# Patient Record
Sex: FEMALE | Race: BLACK OR AFRICAN AMERICAN | NOT HISPANIC OR LATINO | Employment: FULL TIME | URBAN - METROPOLITAN AREA
[De-identification: names, ages, dates, MRNs, and addresses within clinical notes are randomized per-mention and may not be internally consistent; named-entity substitution may affect disease eponyms.]

---

## 2018-11-10 ENCOUNTER — OFFICE VISIT (OUTPATIENT)
Dept: URGENT CARE | Facility: CLINIC | Age: 28
End: 2018-11-10
Payer: MEDICAID

## 2018-11-10 VITALS
RESPIRATION RATE: 18 BRPM | SYSTOLIC BLOOD PRESSURE: 111 MMHG | DIASTOLIC BLOOD PRESSURE: 66 MMHG | TEMPERATURE: 101 F | HEART RATE: 76 BPM | OXYGEN SATURATION: 98 % | HEIGHT: 64 IN | BODY MASS INDEX: 21.17 KG/M2 | WEIGHT: 124 LBS

## 2018-11-10 DIAGNOSIS — R11.2 NON-INTRACTABLE VOMITING WITH NAUSEA, UNSPECIFIED VOMITING TYPE: ICD-10-CM

## 2018-11-10 DIAGNOSIS — N30.01 ACUTE CYSTITIS WITH HEMATURIA: ICD-10-CM

## 2018-11-10 DIAGNOSIS — R50.9 FEVER, UNSPECIFIED FEVER CAUSE: ICD-10-CM

## 2018-11-10 DIAGNOSIS — K52.9 GASTROENTERITIS: Primary | ICD-10-CM

## 2018-11-10 DIAGNOSIS — R10.9 FLANK PAIN: ICD-10-CM

## 2018-11-10 LAB
B-HCG UR QL: NEGATIVE
BILIRUB UR QL STRIP: NEGATIVE
CTP QC/QA: YES
GLUCOSE UR QL STRIP: NEGATIVE
KETONES UR QL STRIP: POSITIVE
LEUKOCYTE ESTERASE UR QL STRIP: POSITIVE
PH, POC UA: 6 (ref 5–8)
POC BLOOD, URINE: POSITIVE
POC NITRATES, URINE: NEGATIVE
PROT UR QL STRIP: NEGATIVE
SP GR UR STRIP: 1.01 (ref 1–1.03)
UROBILINOGEN UR STRIP-ACNC: NORMAL (ref 0.1–1.1)

## 2018-11-10 PROCEDURE — 81003 URINALYSIS AUTO W/O SCOPE: CPT | Mod: QW,S$GLB,, | Performed by: PHYSICIAN ASSISTANT

## 2018-11-10 PROCEDURE — 99203 OFFICE O/P NEW LOW 30 MIN: CPT | Mod: 25,S$GLB,, | Performed by: PHYSICIAN ASSISTANT

## 2018-11-10 PROCEDURE — 81025 URINE PREGNANCY TEST: CPT | Mod: S$GLB,,, | Performed by: PHYSICIAN ASSISTANT

## 2018-11-10 RX ORDER — PROMETHAZINE HYDROCHLORIDE 12.5 MG/1
12.5 TABLET ORAL 4 TIMES DAILY
Qty: 20 TABLET | Refills: 0 | Status: SHIPPED | OUTPATIENT
Start: 2018-11-10

## 2018-11-10 RX ORDER — METOPROLOL TARTRATE 50 MG/1
TABLET ORAL
Refills: 3 | COMMUNITY
Start: 2018-11-08

## 2018-11-10 RX ORDER — ACETAMINOPHEN 500 MG
1000 TABLET ORAL
Status: COMPLETED | OUTPATIENT
Start: 2018-11-10 | End: 2018-11-10

## 2018-11-10 RX ORDER — ONDANSETRON 2 MG/ML
4 INJECTION INTRAMUSCULAR; INTRAVENOUS
Status: COMPLETED | OUTPATIENT
Start: 2018-11-10 | End: 2018-11-10

## 2018-11-10 RX ORDER — CIPROFLOXACIN 500 MG/1
500 TABLET ORAL 2 TIMES DAILY
Qty: 14 TABLET | Refills: 0 | Status: SHIPPED | OUTPATIENT
Start: 2018-11-10 | End: 2018-11-17

## 2018-11-10 RX ADMIN — Medication 1000 MG: at 10:11

## 2018-11-10 RX ADMIN — ONDANSETRON 4 MG: 2 INJECTION INTRAMUSCULAR; INTRAVENOUS at 09:11

## 2018-11-10 NOTE — PROGRESS NOTES
"Subjective:       Patient ID: Marita Deneen Cooks is a 28 y.o. female.    Vitals:  height is 5' 4" (1.626 m) and weight is 56.2 kg (124 lb). Her oral temperature is 100.5 °F (38.1 °C) (abnormal). Her blood pressure is 111/66 and her pulse is 76. Her respiration is 18 and oxygen saturation is 98%.     Chief Complaint: Emesis; Abdominal Pain; and Diarrhea    Patient presents with severe abdominal pain for apprx 2 weeks. Diarrhea vomiting elevated fever.      Emesis    This is a chronic problem. The current episode started 1 to 4 weeks ago. The problem occurs intermittently. The problem has been gradually worsening. The maximum temperature recorded prior to her arrival was 100.4 - 100.9 F. The fever has been present for 5 days or more. Associated symptoms include abdominal pain, chills, diarrhea and a fever. Pertinent negatives include no chest pain. She has tried acetaminophen for the symptoms. The treatment provided mild relief.   Abdominal Pain   This is a recurrent problem. The current episode started 1 to 4 weeks ago. The onset quality is gradual. The problem occurs constantly. The problem has been gradually worsening. The pain is located in the periumbilical region and generalized abdominal region. The pain is at a severity of 10/10. The pain is severe. The quality of the pain is tearing, sharp and cramping. The abdominal pain does not radiate. Associated symptoms include diarrhea, a fever, nausea and vomiting. Pertinent negatives include no constipation, dysuria, hematochezia or melena.   Diarrhea    This is a recurrent problem. The current episode started 1 to 4 weeks ago. The problem occurs 2 to 4 times per day. The problem has been gradually worsening. The stool consistency is described as watery. The patient states that diarrhea awakens her from sleep. Associated symptoms include abdominal pain, chills, a fever and vomiting. The treatment provided no relief.     Review of Systems   Constitution: Positive for " chills, decreased appetite, fever, malaise/fatigue and night sweats.   Cardiovascular: Negative for chest pain.   Respiratory: Negative for shortness of breath.    Musculoskeletal: Negative for back pain.   Gastrointestinal: Positive for abdominal pain, diarrhea, nausea and vomiting. Negative for constipation, hematochezia and melena.   Genitourinary: Negative for dysuria.       Objective:      Physical Exam   Constitutional: She is oriented to person, place, and time. She appears well-developed and well-nourished. She is cooperative.  Non-toxic appearance. She does not appear ill. No distress.   HENT:   Head: Normocephalic and atraumatic.   Right Ear: Hearing, tympanic membrane, external ear and ear canal normal.   Left Ear: Hearing, tympanic membrane, external ear and ear canal normal.   Nose: Nose normal. No mucosal edema, rhinorrhea or nasal deformity. No epistaxis. Right sinus exhibits no maxillary sinus tenderness and no frontal sinus tenderness. Left sinus exhibits no maxillary sinus tenderness and no frontal sinus tenderness.   Mouth/Throat: Uvula is midline, oropharynx is clear and moist and mucous membranes are normal. No trismus in the jaw. Normal dentition. No uvula swelling. No posterior oropharyngeal erythema.   Eyes: Conjunctivae and lids are normal. No scleral icterus.   Sclera clear bilat   Neck: Trachea normal, full passive range of motion without pain and phonation normal. Neck supple.   Cardiovascular: Normal rate, regular rhythm, normal heart sounds, intact distal pulses and normal pulses.   Pulmonary/Chest: Effort normal and breath sounds normal. No respiratory distress.   Abdominal: Soft. Normal appearance and bowel sounds are normal. She exhibits no distension, no abdominal bruit, no pulsatile midline mass and no mass. There is no hepatosplenomegaly. There is generalized tenderness and tenderness in the suprapubic area. There is no rigidity, no rebound, no guarding, no CVA tenderness, no  "tenderness at McBurney's point and negative Brady's sign.   Musculoskeletal: Normal range of motion. She exhibits no edema or deformity.   Neurological: She is alert and oriented to person, place, and time. She has normal strength. She exhibits normal muscle tone. Coordination normal.   Skin: Skin is warm, dry and intact. She is not diaphoretic. No pallor.   Psychiatric: She has a normal mood and affect. Her speech is normal and behavior is normal. Judgment and thought content normal. Cognition and memory are normal.   Nursing note and vitals reviewed.      Assessment:       1. Gastroenteritis    2. Acute cystitis with hematuria    3. Non-intractable vomiting with nausea, unspecified vomiting type    4. Flank pain    5. Fever, unspecified fever cause        Plan:         Gastroenteritis    Acute cystitis with hematuria  -     ciprofloxacin HCl (CIPRO) 500 MG tablet; Take 1 tablet (500 mg total) by mouth 2 (two) times daily. for 7 days  Dispense: 14 tablet; Refill: 0    Non-intractable vomiting with nausea, unspecified vomiting type  -     ondansetron injection 4 mg  -     promethazine (PHENERGAN) 12.5 MG Tab; Take 1 tablet (12.5 mg total) by mouth 4 (four) times daily.  Dispense: 20 tablet; Refill: 0    Flank pain  -     POCT Urinalysis, Dipstick, Automated, W/O Scope  -     POCT urine pregnancy  -     acetaminophen tablet 1,000 mg    Fever, unspecified fever cause          Vomiting (Adult)  Vomiting is a common symptom that may be due to different causes. These include gastroenteritis ("stomach flu"), food poisoning and gastritis. There are other more serious causes of vomiting which may be hard to diagnose early in the illness. Therefore, it is important to watch for the warning signs listed below.  The main danger from repeated vomiting is dehydration. This is due to excess loss of water and minerals from the body. When this occurs, body fluids must be replaced.  Home care  · If symptoms are severe, rest at home " for the next 24 hours.  · Because your symptoms may be from an infection, wash your hands frequently and well, and use alcohol-based  to avoid spreading the infection to others.  · Wash your hands for at least 20 seconds. Hum the happy birthday song twice for the correct length of time.  · Wash your hands after using the toilet, before and after preparing food, before eating food, after changing a diaper, cleaning a wound, caring for a sick person, and blowing your nose, coughing, or sneezing. You should also wash your hands after caring for someone who is sick, touching pet food, or treats, and touching an animal, or animal waste.  · You may use acetaminophen or NSAID medicines like ibuprofen or naproxen to control fever, unless another medicine was prescribed. If you have chronic liver or kidney disease or ever had a stomach ulcer or GI bleeding, talk with your doctor before using these medicines. Aspirin should never be used in anyone under 18 years of age who is ill with a fever. It may cause severe liver damage. Don't use NSAID medicines if you are already taking one for another condition (like arthritis) or are on aspirin (such as for heart disease, or after a stroke)  · Avoid tobacco and alcohol use, which may worsen your symptoms.  · If medicines for vomiting were prescribed, take as directed.  · Once vomiting stops, then follow these guidelines:  During the first 12 to 24 hours follow the diet below:  · Fruit juices. Apple, grape juice, clear fruit drinks, and electrolyte replacement drinks.  · Beverages. Soft drinks without caffeine; mineral water (plain or flavored), decaffeinated tea and coffee.  · Soups. Clear broth, consommé and bouillon  · Desserts. Plain gelatin, popsicles and fruit juice bars. As you feel better, you may add 6-8 ounces of yogurt per day.  During the next 24 hours you may add the following to the above:  · Hot cereal, plain toast, bread, rolls, crackers  · Plain noodles,  "rice, mashed potatoes, chicken noodle or rice soup  · Unsweetened canned fruit (avoid pineapple), bananas  · Limit caffeine and chocolate. No spices or seasonings except salt.  During the next 24 hours:  Gradually resume a normal diet, as you feel better and your symptoms lessen.  Follow-up care  Follow up with your healthcare provider, or as advised.  When to seek medical advice  Call your healthcare provider right away if any of these occur:  · Constant right-sided lower abdominal pain or increasing general abdominal pain  · Continued vomiting (unable to keep liquids down) for 24 hours  · Frequent diarrhea (more than 5 times a day); blood (red or black color) or mucus in diarrhea  · Reduced urine output or extreme thirst  · Weakness, dizziness or fainting  · Unusually drowsy or confused  · Fever of 100.4°F (38°C) oral or higher, or as directed  · Yellow color of the eyes or skin  Date Last Reviewed: 11/16/2015  © 1508-9750 "[employer]. [address] [place] [address]. All rights reserved. This information is not intended as a substitute for professional medical care. Always follow your healthcare professional's instructions.        Bladder Infection, Female (Adult)    Urine is normally doesn't have any bacteria in it. But bacteria can get into the urinary tract from the skin around the rectum. Or they can travel in the blood from elsewhere in the body. Once they are in your urinary tract, they can cause infection in the urethra (urethritis), the bladder (cystitis), or the kidneys (pyelonephritis).  The most common place for an infection is in the bladder. This is called a bladder infection. This is one of the most common infections in women. Most bladder infections are easily treated. They are not serious unless the infection spreads to the kidney.  The phrases "bladder infection," "UTI," and "cystitis" are often used to describe the same thing. But they are not always the same. Cystitis is " an inflammation of the bladder. The most common cause of cystitis is an infection.  Symptoms  The infection causes inflammation in the urethra and bladder. This causes many of the symptoms. The most common symptoms of a bladder infection are:  · Pain or burning when urinating  · Having to urinate more often than usual  · Urgent need to urinate  · Only a small amount of urine comes out  · Blood in urine  · Abdominal discomfort. This is usually in the lower abdomen above the pubic bone.  · Cloudy urine  · Strong- or bad-smelling urine  · Unable to urinate (urinary retention)  · Unable to hold urine in (urinary incontinence)  · Fever  · Loss of appetite  · Confusion (in older adults)  Causes  Bladder infections are not contagious. You can't get one from someone else, from a toilet seat, or from sharing a bath.  The most common cause of bladder infections is bacteria from the bowels. The bacteria get onto the skin around the opening of the urethra. From there, they can get into the urine and travel up to the bladder, causing inflammation and infection. This usually happens because of:  · Wiping improperly after urinating. Always wipe from front to back.  · Bowel incontinence  · Pregnancy  · Procedures such as having a catheter inserted  · Older age  · Not emptying your bladder. This can allow bacteria a chance to grow in your urine.  · Dehydration  · Constipation  · Sex  · Use of a diaphragm for birth control   Treatment  Bladder infections are diagnosed by a urine test. They are treated with antibiotics and usually clear up quickly without complications. Treatment helps prevent a more serious kidney infection.  Medicines  Medicines can help in the treatment of a bladder infection:  · Take antibiotics until they are used up, even if you feel better. It is important to finish them to make sure the infection has cleared.  · You can use acetaminophen or ibuprofen for pain, fever, or discomfort, unless another medicine was  prescribed. If you have chronic liver or kidney disease, talk with your healthcare provider before using these medicines. Also talk with your provider if you've ever had a stomach ulcer or gastrointestinal bleeding, or are taking blood-thinner medicines.  · If you are given phenazopydridine to reduce burning with urination, it will cause your urine to become a bright orange color. This can stain clothing.  Care and prevention  These self-care steps can help prevent future infections:  · Drink plenty of fluids to prevent dehydration and flush out your bladder. Do this unless you must restrict fluids for other health reasons, or your doctor told you not to.  · Proper cleaning after going to the bathroom is important. Wipe from front to back after using the toilet to prevent the spread of bacteria.  · Urinate more often. Don't try to hold urine in for a long time.  · Wear loose-fitting clothes and cotton underwear. Avoid tight-fitting pants.  · Improve your diet and prevent constipation. Eat more fresh fruit and vegetables, and fiber, and less junk and fatty foods.  · Avoid sex until your symptoms are gone.  · Avoid caffeine, alcohol, and spicy foods. These can irritate your bladder.  · Urinate right after intercourse to flush out your bladder.  · If you use birth control pills and have frequent bladder infections, discuss it with your doctor.  Follow-up care  Call your healthcare provider if all symptoms are not gone after 3 days of treatment. This is especially important if you have repeat infections.  If a culture was done, you will be told if your treatment needs to be changed. If directed, you can call to find out the results.  If X-rays were done, you will be told if the results will affect your treatment.  Call 911  Call 911 if any of the following occur:  · Trouble breathing  · Hard to wake up or confusion  · Fainting or loss of consciousness  · Rapid heart rate  When to seek medical advice  Call your  healthcare provider right away if any of these occur:  · Fever of 100.4ºF (38.0ºC) or higher, or as directed by your healthcare provider  · Symptoms are not better by the third day of treatment  · Back or belly (abdominal) pain that gets worse  · Repeated vomiting, or unable to keep medicine down  · Weakness or dizziness  · Vaginal discharge  · Pain, redness, or swelling in the outer vaginal area (labia)  Date Last Reviewed: 10/1/2016  © 2540-2410 [employer]. [address] [place] [address]. All rights reserved. This information is not intended as a substitute for professional medical care. Always follow your healthcare professional's instructions.      Please follow up with your Primary care provider within 2-5 days if your signs and symptoms have not resolved or worsen.     If your condition worsens or fails to improve we recommend that you receive another evaluation at the emergency room immediately or contact your primary medical clinic to discuss your concerns.   You must understand that you have received an Urgent Care treatment only and that you may be released before all of your medical problems are known or treated. You, the patient, will arrange for follow up care as instructed.     RED FLAGS/WARNING SYMPTOMS DISCUSSED WITH PATIENT THAT WOULD WARRANT EMERGENT MEDICAL ATTENTION. PATIENT VERBALIZED UNDERSTANDING.

## 2018-11-10 NOTE — PATIENT INSTRUCTIONS
"  Vomiting (Adult)  Vomiting is a common symptom that may be due to different causes. These include gastroenteritis ("stomach flu"), food poisoning and gastritis. There are other more serious causes of vomiting which may be hard to diagnose early in the illness. Therefore, it is important to watch for the warning signs listed below.  The main danger from repeated vomiting is dehydration. This is due to excess loss of water and minerals from the body. When this occurs, body fluids must be replaced.  Home care  · If symptoms are severe, rest at home for the next 24 hours.  · Because your symptoms may be from an infection, wash your hands frequently and well, and use alcohol-based  to avoid spreading the infection to others.  · Wash your hands for at least 20 seconds. Hum the happy birthday song twice for the correct length of time.  · Wash your hands after using the toilet, before and after preparing food, before eating food, after changing a diaper, cleaning a wound, caring for a sick person, and blowing your nose, coughing, or sneezing. You should also wash your hands after caring for someone who is sick, touching pet food, or treats, and touching an animal, or animal waste.  · You may use acetaminophen or NSAID medicines like ibuprofen or naproxen to control fever, unless another medicine was prescribed. If you have chronic liver or kidney disease or ever had a stomach ulcer or GI bleeding, talk with your doctor before using these medicines. Aspirin should never be used in anyone under 18 years of age who is ill with a fever. It may cause severe liver damage. Don't use NSAID medicines if you are already taking one for another condition (like arthritis) or are on aspirin (such as for heart disease, or after a stroke)  · Avoid tobacco and alcohol use, which may worsen your symptoms.  · If medicines for vomiting were prescribed, take as directed.  · Once vomiting stops, then follow these guidelines:  During " the first 12 to 24 hours follow the diet below:  · Fruit juices. Apple, grape juice, clear fruit drinks, and electrolyte replacement drinks.  · Beverages. Soft drinks without caffeine; mineral water (plain or flavored), decaffeinated tea and coffee.  · Soups. Clear broth, consommé and bouillon  · Desserts. Plain gelatin, popsicles and fruit juice bars. As you feel better, you may add 6-8 ounces of yogurt per day.  During the next 24 hours you may add the following to the above:  · Hot cereal, plain toast, bread, rolls, crackers  · Plain noodles, rice, mashed potatoes, chicken noodle or rice soup  · Unsweetened canned fruit (avoid pineapple), bananas  · Limit caffeine and chocolate. No spices or seasonings except salt.  During the next 24 hours:  Gradually resume a normal diet, as you feel better and your symptoms lessen.  Follow-up care  Follow up with your healthcare provider, or as advised.  When to seek medical advice  Call your healthcare provider right away if any of these occur:  · Constant right-sided lower abdominal pain or increasing general abdominal pain  · Continued vomiting (unable to keep liquids down) for 24 hours  · Frequent diarrhea (more than 5 times a day); blood (red or black color) or mucus in diarrhea  · Reduced urine output or extreme thirst  · Weakness, dizziness or fainting  · Unusually drowsy or confused  · Fever of 100.4°F (38°C) oral or higher, or as directed  · Yellow color of the eyes or skin  Date Last Reviewed: 11/16/2015 © 2000-2017 [employer]. [address] [place] [address], [place] [address]. All rights reserved. This information is not intended as a substitute for professional medical care. Always follow your healthcare professional's instructions.        Bladder Infection, Female (Adult)    Urine is normally doesn't have any bacteria in it. But bacteria can get into the urinary tract from the skin around the rectum. Or they can travel in the blood from elsewhere in the  "body. Once they are in your urinary tract, they can cause infection in the urethra (urethritis), the bladder (cystitis), or the kidneys (pyelonephritis).  The most common place for an infection is in the bladder. This is called a bladder infection. This is one of the most common infections in women. Most bladder infections are easily treated. They are not serious unless the infection spreads to the kidney.  The phrases "bladder infection," "UTI," and "cystitis" are often used to describe the same thing. But they are not always the same. Cystitis is an inflammation of the bladder. The most common cause of cystitis is an infection.  Symptoms  The infection causes inflammation in the urethra and bladder. This causes many of the symptoms. The most common symptoms of a bladder infection are:  · Pain or burning when urinating  · Having to urinate more often than usual  · Urgent need to urinate  · Only a small amount of urine comes out  · Blood in urine  · Abdominal discomfort. This is usually in the lower abdomen above the pubic bone.  · Cloudy urine  · Strong- or bad-smelling urine  · Unable to urinate (urinary retention)  · Unable to hold urine in (urinary incontinence)  · Fever  · Loss of appetite  · Confusion (in older adults)  Causes  Bladder infections are not contagious. You can't get one from someone else, from a toilet seat, or from sharing a bath.  The most common cause of bladder infections is bacteria from the bowels. The bacteria get onto the skin around the opening of the urethra. From there, they can get into the urine and travel up to the bladder, causing inflammation and infection. This usually happens because of:  · Wiping improperly after urinating. Always wipe from front to back.  · Bowel incontinence  · Pregnancy  · Procedures such as having a catheter inserted  · Older age  · Not emptying your bladder. This can allow bacteria a chance to grow in your " urine.  · Dehydration  · Constipation  · Sex  · Use of a diaphragm for birth control   Treatment  Bladder infections are diagnosed by a urine test. They are treated with antibiotics and usually clear up quickly without complications. Treatment helps prevent a more serious kidney infection.  Medicines  Medicines can help in the treatment of a bladder infection:  · Take antibiotics until they are used up, even if you feel better. It is important to finish them to make sure the infection has cleared.  · You can use acetaminophen or ibuprofen for pain, fever, or discomfort, unless another medicine was prescribed. If you have chronic liver or kidney disease, talk with your healthcare provider before using these medicines. Also talk with your provider if you've ever had a stomach ulcer or gastrointestinal bleeding, or are taking blood-thinner medicines.  · If you are given phenazopydridine to reduce burning with urination, it will cause your urine to become a bright orange color. This can stain clothing.  Care and prevention  These self-care steps can help prevent future infections:  · Drink plenty of fluids to prevent dehydration and flush out your bladder. Do this unless you must restrict fluids for other health reasons, or your doctor told you not to.  · Proper cleaning after going to the bathroom is important. Wipe from front to back after using the toilet to prevent the spread of bacteria.  · Urinate more often. Don't try to hold urine in for a long time.  · Wear loose-fitting clothes and cotton underwear. Avoid tight-fitting pants.  · Improve your diet and prevent constipation. Eat more fresh fruit and vegetables, and fiber, and less junk and fatty foods.  · Avoid sex until your symptoms are gone.  · Avoid caffeine, alcohol, and spicy foods. These can irritate your bladder.  · Urinate right after intercourse to flush out your bladder.  · If you use birth control pills and have frequent bladder infections, discuss it  with your doctor.  Follow-up care  Call your healthcare provider if all symptoms are not gone after 3 days of treatment. This is especially important if you have repeat infections.  If a culture was done, you will be told if your treatment needs to be changed. If directed, you can call to find out the results.  If X-rays were done, you will be told if the results will affect your treatment.  Call 911  Call 911 if any of the following occur:  · Trouble breathing  · Hard to wake up or confusion  · Fainting or loss of consciousness  · Rapid heart rate  When to seek medical advice  Call your healthcare provider right away if any of these occur:  · Fever of 100.4ºF (38.0ºC) or higher, or as directed by your healthcare provider  · Symptoms are not better by the third day of treatment  · Back or belly (abdominal) pain that gets worse  · Repeated vomiting, or unable to keep medicine down  · Weakness or dizziness  · Vaginal discharge  · Pain, redness, or swelling in the outer vaginal area (labia)  Date Last Reviewed: 10/1/2016  © 3467-8942 [employer]. [address] [place] [address]. All rights reserved. This information is not intended as a substitute for professional medical care. Always follow your healthcare professional's instructions.      Please follow up with your Primary care provider within 2-5 days if your signs and symptoms have not resolved or worsen.     If your condition worsens or fails to improve we recommend that you receive another evaluation at the emergency room immediately or contact your primary medical clinic to discuss your concerns.   You must understand that you have received an Urgent Care treatment only and that you may be released before all of your medical problems are known or treated. You, the patient, will arrange for follow up care as instructed.     RED FLAGS/WARNING SYMPTOMS DISCUSSED WITH PATIENT THAT WOULD WARRANT EMERGENT MEDICAL ATTENTION. PATIENT VERBALIZED  UNDERSTANDING.

## 2018-11-12 ENCOUNTER — TELEPHONE (OUTPATIENT)
Dept: URGENT CARE | Facility: CLINIC | Age: 28
End: 2018-11-12

## 2018-12-20 ENCOUNTER — OFFICE VISIT (OUTPATIENT)
Dept: URGENT CARE | Facility: CLINIC | Age: 28
End: 2018-12-20
Payer: MEDICAID

## 2018-12-20 VITALS
SYSTOLIC BLOOD PRESSURE: 146 MMHG | WEIGHT: 116 LBS | HEART RATE: 70 BPM | DIASTOLIC BLOOD PRESSURE: 80 MMHG | BODY MASS INDEX: 19.81 KG/M2 | HEIGHT: 64 IN | TEMPERATURE: 97 F | OXYGEN SATURATION: 97 %

## 2018-12-20 DIAGNOSIS — J30.1 SEASONAL ALLERGIC RHINITIS DUE TO POLLEN: Primary | ICD-10-CM

## 2018-12-20 DIAGNOSIS — J06.9 URI, ACUTE: ICD-10-CM

## 2018-12-20 DIAGNOSIS — J40 BRONCHITIS: ICD-10-CM

## 2018-12-20 PROCEDURE — 99214 OFFICE O/P EST MOD 30 MIN: CPT | Mod: S$GLB,,, | Performed by: FAMILY MEDICINE

## 2018-12-20 RX ORDER — ALBUTEROL SULFATE 90 UG/1
2 AEROSOL, METERED RESPIRATORY (INHALATION) EVERY 6 HOURS PRN
Qty: 1 INHALER | Refills: 0 | Status: SHIPPED | OUTPATIENT
Start: 2018-12-20

## 2018-12-20 RX ORDER — AMOXICILLIN 500 MG/1
500 CAPSULE ORAL EVERY 8 HOURS
Qty: 30 CAPSULE | Refills: 0 | Status: SHIPPED | OUTPATIENT
Start: 2018-12-20 | End: 2018-12-30

## 2018-12-20 RX ORDER — DEXAMETHASONE SODIUM PHOSPHATE 100 MG/10ML
10 INJECTION INTRAMUSCULAR; INTRAVENOUS
Status: COMPLETED | OUTPATIENT
Start: 2018-12-20 | End: 2018-12-20

## 2018-12-20 RX ORDER — LORATADINE 10 MG/1
10 TABLET ORAL DAILY
Qty: 30 TABLET | Refills: 0 | Status: SHIPPED | OUTPATIENT
Start: 2018-12-20 | End: 2019-01-19

## 2018-12-20 RX ADMIN — DEXAMETHASONE SODIUM PHOSPHATE 10 MG: 100 INJECTION INTRAMUSCULAR; INTRAVENOUS at 10:12

## 2018-12-20 NOTE — PROGRESS NOTES
"Subjective:       Patient ID: Marita Deneen Cooks is a 28 y.o. female.    Vitals:  height is 5' 4" (1.626 m) and weight is 52.6 kg (116 lb). Her temperature is 96.5 °F (35.8 °C). Her blood pressure is 146/80 (abnormal) and her pulse is 70. Her oxygen saturation is 97%.     Chief Complaint: Cough    27 y/o female with c/o cough and chest congestion off and on x few days, no fever or chills. Has hx of TOF as child and has had 2 heart Sx.    States cough is worse now. Occasional wheezing      Cough   This is a recurrent problem. The current episode started 1 to 4 weeks ago. The problem has been waxing and waning. The problem occurs every few minutes. Associated symptoms include myalgias and a sore throat. Pertinent negatives include no chills, ear pain, eye redness, fever, headaches, hemoptysis, rash, shortness of breath or wheezing. Associated symptoms comments: Neck pain on left side. Nothing aggravates the symptoms. She has tried OTC cough suppressant for the symptoms. The treatment provided no relief. There is no history of asthma, bronchitis, COPD, emphysema or pneumonia.       Constitution: Negative for chills, sweating, fatigue and fever.   HENT: Positive for congestion, sore throat and voice change. Negative for ear pain, sinus pain and sinus pressure.    Neck: Negative for painful lymph nodes.   Eyes: Negative for eye redness.   Respiratory: Positive for chest tightness, cough and sputum production. Negative for bloody sputum, COPD, shortness of breath, stridor, wheezing and asthma.    Gastrointestinal: Negative for nausea and vomiting.   Musculoskeletal: Positive for muscle ache.   Skin: Negative for rash.   Allergic/Immunologic: Negative for seasonal allergies and asthma.   Neurological: Negative for headaches.   Hematologic/Lymphatic: Negative for swollen lymph nodes.       Objective:      Physical Exam   Constitutional: She is oriented to person, place, and time. She appears well-developed and " well-nourished. She is cooperative.  Non-toxic appearance. She does not appear ill. No distress.   HENT:   Head: Normocephalic and atraumatic.   Right Ear: Hearing, tympanic membrane, external ear and ear canal normal.   Left Ear: Hearing, tympanic membrane, external ear and ear canal normal.   Nose: Nose normal. No mucosal edema, rhinorrhea or nasal deformity. No epistaxis. Right sinus exhibits no maxillary sinus tenderness and no frontal sinus tenderness. Left sinus exhibits no maxillary sinus tenderness and no frontal sinus tenderness.   Mouth/Throat: Uvula is midline, oropharynx is clear and moist and mucous membranes are normal. No trismus in the jaw. Normal dentition. No uvula swelling. No oropharyngeal exudate or posterior oropharyngeal erythema.   Eyes: Conjunctivae and lids are normal. Right eye exhibits no discharge. Left eye exhibits no discharge. No scleral icterus.   Sclera clear bilat   Neck: Trachea normal, normal range of motion, full passive range of motion without pain and phonation normal. Neck supple. No JVD present. No tracheal deviation present.   Cardiovascular: Normal rate, regular rhythm, intact distal pulses and normal pulses. Exam reveals no gallop.   Murmur heard.  Holo systolic murmur   Pulmonary/Chest: Effort normal and breath sounds normal. No respiratory distress.   No audible wheezes minimal rhonchi   Abdominal: Soft. Normal appearance and bowel sounds are normal. She exhibits no distension, no pulsatile midline mass and no mass. There is no tenderness.   Musculoskeletal: Normal range of motion. She exhibits no edema or deformity.   Neurological: She is alert and oriented to person, place, and time. She exhibits normal muscle tone. Coordination normal.   Skin: Skin is warm, dry and intact. She is not diaphoretic. No pallor.   Psychiatric: She has a normal mood and affect. Her speech is normal and behavior is normal. Judgment and thought content normal. Cognition and memory are  normal.   Nursing note and vitals reviewed.      Assessment:       1. Seasonal allergic rhinitis due to pollen    2. URI, acute    3. Bronchitis        Plan:         Seasonal allergic rhinitis due to pollen    URI, acute    Bronchitis    Other orders  -     loratadine (CLARITIN) 10 mg tablet; Take 1 tablet (10 mg total) by mouth once daily.  Dispense: 30 tablet; Refill: 0  -     dexamethasone injection 10 mg  -     amoxicillin (AMOXIL) 500 MG capsule; Take 1 capsule (500 mg total) by mouth every 8 (eight) hours. for 10 days  Dispense: 30 capsule; Refill: 0  -     albuterol (PROVENTIL/VENTOLIN HFA) 90 mcg/actuation inhaler; Inhale 2 puffs into the lungs every 6 (six) hours as needed for Wheezing or Shortness of Breath. Rescue  Dispense: 1 Inhaler; Refill: 0

## 2021-08-31 ENCOUNTER — HISTORICAL (OUTPATIENT)
Dept: ADMINISTRATIVE | Facility: HOSPITAL | Age: 31
End: 2021-08-31

## 2021-08-31 LAB — SARS-COV-2 RNA RESP QL NAA+PROBE: NOT DETECTED

## 2021-11-22 ENCOUNTER — HOSPITAL ENCOUNTER (EMERGENCY)
Facility: HOSPITAL | Age: 31
Discharge: HOME | End: 2021-11-22
Attending: EMERGENCY MEDICINE
Payer: MEDICAID

## 2021-11-22 VITALS
HEART RATE: 98 BPM | DIASTOLIC BLOOD PRESSURE: 88 MMHG | HEIGHT: 64 IN | TEMPERATURE: 98 F | SYSTOLIC BLOOD PRESSURE: 128 MMHG | OXYGEN SATURATION: 98 % | BODY MASS INDEX: 24.75 KG/M2 | RESPIRATION RATE: 18 BRPM | WEIGHT: 145 LBS

## 2021-11-22 DIAGNOSIS — R00.2 PALPITATIONS: Primary | ICD-10-CM

## 2021-11-22 DIAGNOSIS — R07.9 CHEST PAIN: ICD-10-CM

## 2021-11-22 LAB
ALBUMIN SERPL BCP-MCNC: 3.9 G/DL (ref 3.5–5.2)
ALP SERPL-CCNC: 55 U/L (ref 55–135)
ALT SERPL W/O P-5'-P-CCNC: 28 U/L (ref 10–44)
ANION GAP SERPL CALC-SCNC: 6 MMOL/L (ref 8–16)
AST SERPL-CCNC: 15 U/L (ref 10–40)
B-HCG UR QL: NEGATIVE
BASOPHILS # BLD AUTO: 0.02 K/UL (ref 0–0.2)
BASOPHILS NFR BLD: 0.4 % (ref 0–1.9)
BILIRUB SERPL-MCNC: 0.3 MG/DL (ref 0.1–1)
BUN SERPL-MCNC: 12 MG/DL (ref 6–20)
CALCIUM SERPL-MCNC: 8.9 MG/DL (ref 8.7–10.5)
CHLORIDE SERPL-SCNC: 105 MMOL/L (ref 95–110)
CO2 SERPL-SCNC: 26 MMOL/L (ref 23–29)
CREAT SERPL-MCNC: 1.2 MG/DL (ref 0.5–1.4)
DIFFERENTIAL METHOD: ABNORMAL
EOSINOPHIL # BLD AUTO: 0.2 K/UL (ref 0–0.5)
EOSINOPHIL NFR BLD: 3.8 % (ref 0–8)
ERYTHROCYTE [DISTWIDTH] IN BLOOD BY AUTOMATED COUNT: 12.9 % (ref 11.5–14.5)
EST. GFR  (AFRICAN AMERICAN): >60 ML/MIN/1.73 M^2
EST. GFR  (NON AFRICAN AMERICAN): 59.5 ML/MIN/1.73 M^2
GLUCOSE SERPL-MCNC: 85 MG/DL (ref 70–110)
HCT VFR BLD AUTO: 35.1 % (ref 37–48.5)
HGB BLD-MCNC: 11.3 G/DL (ref 12–16)
IMM GRANULOCYTES # BLD AUTO: 0.01 K/UL (ref 0–0.04)
IMM GRANULOCYTES NFR BLD AUTO: 0.2 % (ref 0–0.5)
LYMPHOCYTES # BLD AUTO: 1.7 K/UL (ref 1–4.8)
LYMPHOCYTES NFR BLD: 31.7 % (ref 18–48)
MCH RBC QN AUTO: 27 PG (ref 27–31)
MCHC RBC AUTO-ENTMCNC: 32.2 G/DL (ref 32–36)
MCV RBC AUTO: 84 FL (ref 82–98)
MONOCYTES # BLD AUTO: 0.4 K/UL (ref 0.3–1)
MONOCYTES NFR BLD: 7.8 % (ref 4–15)
NEUTROPHILS # BLD AUTO: 3 K/UL (ref 1.8–7.7)
NEUTROPHILS NFR BLD: 56.1 % (ref 38–73)
NRBC BLD-RTO: 0 /100 WBC
PLATELET # BLD AUTO: 220 K/UL (ref 150–450)
PMV BLD AUTO: 9.9 FL (ref 9.2–12.9)
POTASSIUM SERPL-SCNC: 3.8 MMOL/L (ref 3.5–5.1)
PROT SERPL-MCNC: 7.7 G/DL (ref 6–8.4)
RBC # BLD AUTO: 4.18 M/UL (ref 4–5.4)
SODIUM SERPL-SCNC: 137 MMOL/L (ref 136–145)
TROPONIN I SERPL DL<=0.01 NG/ML-MCNC: <0.02 NG/ML (ref 0–0.03)
WBC # BLD AUTO: 5.26 K/UL (ref 3.9–12.7)

## 2021-11-22 PROCEDURE — 84484 ASSAY OF TROPONIN QUANT: CPT | Performed by: EMERGENCY MEDICINE

## 2021-11-22 PROCEDURE — 80053 COMPREHEN METABOLIC PANEL: CPT | Performed by: EMERGENCY MEDICINE

## 2021-11-22 PROCEDURE — 81025 URINE PREGNANCY TEST: CPT | Performed by: EMERGENCY MEDICINE

## 2021-11-22 PROCEDURE — 36415 COLL VENOUS BLD VENIPUNCTURE: CPT | Performed by: EMERGENCY MEDICINE

## 2021-11-22 PROCEDURE — 93010 EKG 12-LEAD: ICD-10-PCS | Mod: ,,, | Performed by: INTERNAL MEDICINE

## 2021-11-22 PROCEDURE — 99285 EMERGENCY DEPT VISIT HI MDM: CPT | Mod: 25

## 2021-11-22 PROCEDURE — 93005 ELECTROCARDIOGRAM TRACING: CPT

## 2021-11-22 PROCEDURE — 93010 ELECTROCARDIOGRAM REPORT: CPT | Mod: ,,, | Performed by: INTERNAL MEDICINE

## 2021-11-22 PROCEDURE — 85025 COMPLETE CBC W/AUTO DIFF WBC: CPT | Performed by: EMERGENCY MEDICINE

## 2023-04-14 ENCOUNTER — HOSPITAL ENCOUNTER (EMERGENCY)
Facility: HOSPITAL | Age: 33
Discharge: HOME | End: 2023-04-14
Attending: EMERGENCY MEDICINE
Payer: MEDICAID

## 2023-04-14 VITALS
WEIGHT: 140 LBS | HEART RATE: 74 BPM | RESPIRATION RATE: 18 BRPM | OXYGEN SATURATION: 100 % | BODY MASS INDEX: 23.9 KG/M2 | DIASTOLIC BLOOD PRESSURE: 77 MMHG | SYSTOLIC BLOOD PRESSURE: 126 MMHG | HEIGHT: 64 IN | TEMPERATURE: 96 F

## 2023-04-14 DIAGNOSIS — H57.12 LEFT EYE PAIN: ICD-10-CM

## 2023-04-14 DIAGNOSIS — T26.12XA: Primary | ICD-10-CM

## 2023-04-14 LAB
B-HCG UR QL: NEGATIVE
CTP QC/QA: YES

## 2023-04-14 PROCEDURE — 25000003 PHARM REV CODE 250

## 2023-04-14 PROCEDURE — 81025 URINE PREGNANCY TEST: CPT | Performed by: PHYSICIAN ASSISTANT

## 2023-04-14 PROCEDURE — 25000003 PHARM REV CODE 250: Performed by: PHYSICIAN ASSISTANT

## 2023-04-14 PROCEDURE — 99283 EMERGENCY DEPT VISIT LOW MDM: CPT

## 2023-04-14 RX ORDER — AMITRIPTYLINE HYDROCHLORIDE 50 MG/1
50 TABLET, FILM COATED ORAL NIGHTLY
COMMUNITY
Start: 2023-03-22

## 2023-04-14 RX ORDER — NALTREXONE HYDROCHLORIDE 50 MG/1
25 TABLET, FILM COATED ORAL
COMMUNITY
Start: 2022-11-09

## 2023-04-14 RX ORDER — ERYTHROMYCIN 5 MG/G
OINTMENT OPHTHALMIC
Qty: 3.5 G | Refills: 0 | Status: SHIPPED | OUTPATIENT
Start: 2023-04-14

## 2023-04-14 RX ORDER — FUROSEMIDE 20 MG/1
20 TABLET ORAL
COMMUNITY
Start: 2023-02-10

## 2023-04-14 RX ORDER — ERYTHROMYCIN 5 MG/G
OINTMENT OPHTHALMIC
Status: COMPLETED | OUTPATIENT
Start: 2023-04-14 | End: 2023-04-14

## 2023-04-14 RX ORDER — GABAPENTIN 300 MG/1
CAPSULE ORAL
COMMUNITY
Start: 2023-03-22

## 2023-04-14 RX ORDER — ERYTHROMYCIN 5 MG/G
OINTMENT OPHTHALMIC
Qty: 3.5 G | Refills: 0 | Status: SHIPPED | OUTPATIENT
Start: 2023-04-14 | End: 2023-04-14 | Stop reason: SDUPTHER

## 2023-04-14 RX ADMIN — ERYTHROMYCIN 1 INCH: 5 OINTMENT OPHTHALMIC at 08:04

## 2023-04-14 RX ADMIN — FLUORESCEIN SODIUM 1 EACH: 1 STRIP OPHTHALMIC at 07:04

## 2023-04-14 NOTE — Clinical Note
"Otilia Freeman"Marita" Cooks was seen and treated in our emergency department on 4/14/2023.  She may return to work on 04/17/2023.       If you have any questions or concerns, please don't hesitate to call.      Cullen Jimenez MD"

## 2023-04-14 NOTE — Clinical Note
"Otilia Freeman"Otilia" Cooks was seen and treated in our emergency department on 4/14/2023.  She may return to work on 04/18/2023.       If you have any questions or concerns, please don't hesitate to call.      Lennie KHAN    "

## 2023-04-14 NOTE — FIRST PROVIDER EVALUATION
"Medical screening examination initiated.  I have conducted a focused provider triage encounter, findings are as follows:    Brief history of present illness:  Pt reports grease splashed in her left eye just prior to arrival. Now with pain and irritation to left eye.    Vitals:    04/14/23 1852   BP: 115/78   Pulse: 79   Resp: 18   Temp: 97.8 °F (36.6 °C)   TempSrc: Oral   SpO2: 99%   Weight: 63.5 kg (140 lb)   Height: 5' 4" (1.626 m)       Pertinent physical exam:  No acute distress, speaking in full sentences, ambulating with normal gait, awake alert and oriented, no respiratory distress    Brief workup plan:  UPT and to ED for eye exam. Attempted to order proparacaine drops but pt is allergic to benzocaine so will defer to ED provider    Preliminary workup initiated; this workup will be continued and followed by the physician or advanced practice provider that is assigned to the patient when roomed.  "

## 2023-04-15 NOTE — DISCHARGE INSTRUCTIONS
Diagnosis:   1. Thermal injury to conjunctiva of left eye, initial encounter    2. Left eye pain      Home Care Instructions:  - You can use Tylenol or Motrin for pain  - Use erythromycin as prescribed for your eye    Follow-Up Plan:  - A referral has been made to ophthalmology clinic and you will receive a call to schedule an appointment.  You may also call to schedule an appointment.  - Follow-up with: Primary care provider within 1 week.    Return to the Emergency Department for symptoms including but not limited to: worsening symptoms, vision loss, or other concerning symptoms.

## 2023-04-15 NOTE — ED PROVIDER NOTES
"Encounter Date: 2023       History     Chief Complaint   Patient presents with    Eye Problem     34 yo female to triage for left eye irritation. Pt was working and thinks grease may have popped in her eye. Pt does endorse some pain and slight blurriness to that eye. VSS, NAD, AAOx4     33 year old female who presents to the ED for chief complaint of left eye irritation and pain that started today.  Patient states she works at [employer] and hot grease splashed into her eye about 1 hour ago.  She endorses mild blurry vision and mild sensitivity to light.  Patient denies any other injuries to her face.  She denies that the grease splashed onto other parts of her body.     The history is provided by the patient. No  was used.   Review of patient's allergies indicates:   Allergen Reactions    Articaine-epinephrine bitart Hives and Swelling     Pt states face "swole up" with this local     Benzocaine Hives and Swelling     Pt states her face "swole up" with this local    Iodine and iodide containing products      Past Medical History:   Diagnosis Date    Murmur, cardiac      Past Surgical History:   Procedure Laterality Date    CARDIAC SURGERY       Family History   Problem Relation Age of Onset    No Known Problems Father      Social History     Tobacco Use    Smoking status: Former     Packs/day: 0.50     Years: 11.00     Pack years: 5.50     Types: Cigarettes     Start date: 2003     Quit date: 10/28/2018     Years since quittin.4    Smokeless tobacco: Never   Substance Use Topics    Alcohol use: Yes    Drug use: No     Review of Systems   Eyes:  Positive for photophobia, pain and visual disturbance. Negative for redness.   Skin:  Negative for rash and wound.   Neurological:  Negative for headaches.     Physical Exam     Initial Vitals [23 1852]   BP Pulse Resp Temp SpO2   115/78 79 18 97.8 °F (36.6 °C) 99 %      MAP       --         Physical Exam    Nursing note and " vitals reviewed.  Constitutional: She appears well-developed and well-nourished. No distress.   HENT:   Head: Normocephalic.   Mouth/Throat: Oropharynx is clear and moist.   Eyes: Conjunctivae and EOM are normal. No scleral icterus.   Small thermal sclera injury lateral to iris.   Neck:   Normal range of motion.  Cardiovascular:  Normal rate, regular rhythm and normal heart sounds.           Pulmonary/Chest: Breath sounds normal. No respiratory distress.   Abdominal: Abdomen is soft. She exhibits no distension. There is no abdominal tenderness. There is no rebound and no guarding.   Musculoskeletal:         General: Normal range of motion.      Cervical back: Normal range of motion.     Neurological: She is alert.   Skin: Skin is warm. Capillary refill takes less than 2 seconds.   Psychiatric: She has a normal mood and affect.       ED Course   Procedures  Labs Reviewed   POCT URINE PREGNANCY          Imaging Results    None          Medications   fluorescein ophthalmic strip 1 each (1 each Left Eye Given by Provider 4/14/23 1900)   erythromycin 5 mg/gram (0.5 %) ophthalmic ointment (1 inch Left Eye Given 4/14/23 2020)     Medical Decision Making:   Initial Assessment:   Evaluation of 33 year old female with left eye irritation and pain.  She is sitting in no apparent distress.  She is hemodynamically stable.  Differential Diagnosis:   Thermal eye injury, corneal abrasion, foreign object in eye  ED Management:  Patient presents with left eye irritation and pain after hot grease popped into her left eye.  She endorses mild blurry vision and photophobia.  I used fluorescein and wood's lamp to further evaluate her eye.  Patient has a small thermal sclera injury lateral to the iris on her left eye.  She had some pain relief after saline and fluorescein were applied.  Erythromycin ointment was applied to her eye.    Discussed and prescribed erythromycin ointment for antibacterial protection.  Discussed referral and  follow up with ophthalmology clinic and with primary care provider.  Provided precautions for when to return to the emergency department.  Patient discharged to home.                            Clinical Impression:   Final diagnoses:  [T26.12XA] Thermal injury to conjunctiva of left eye, initial encounter (Primary)  [H57.12] Left eye pain        ED Disposition Condition    Discharge Stable          ED Prescriptions       Medication Sig Dispense Start Date End Date Auth. Provider    erythromycin (ROMYCIN) ophthalmic ointment  (Status: Discontinued) Place a 1/2 inch ribbon of ointment into the lower eyelid. 3.5 g 4/14/2023 4/14/2023 Cullen Jimenez MD    erythromycin (ROMYCIN) ophthalmic ointment Place a 1/2 inch ribbon of ointment into the lower eyelid 3 to 4 times a day for 5 days. 3.5 g 4/14/2023 -- Cullen Jimenez MD          Follow-up Information       Follow up With Specialties Details Why Contact Info    [facility]THAL[facility] Ophthalmology Schedule an appointment as soon as possible for a visit in 1 week  [address] [facility] [address]  [phone]             Cullen Jimenez MD  Resident  04/15/23 3939

## 2023-04-18 ENCOUNTER — TELEPHONE (OUTPATIENT)
Dept: OPHTHALMOLOGY | Facility: CLINIC | Age: 33
End: 2023-04-18
Payer: MEDICAID

## 2023-04-18 NOTE — TELEPHONE ENCOUNTER
----- Message from Estella Sheridan MA sent at 4/18/2023  1:03 PM CDT -----  Regarding: FW: appointment  Thermal injury to cornea  ----- Message -----  From: Anne-Marie Akbar  Sent: 4/18/2023  12:46 PM CDT  To: [facility] Clinical Support Staff  Subject: appointment                                      Name of caller: emma       What is the requesting detail: pt has a referral to be seen. Please give her a call back to schedule an appt.       Can the clinic reply by [facility]: yes       What number to call back:[phone]

## 2024-02-22 ENCOUNTER — HOSPITAL ENCOUNTER (EMERGENCY)
Facility: HOSPITAL | Age: 34
Discharge: HOME | End: 2024-02-22
Attending: EMERGENCY MEDICINE
Payer: MEDICARE

## 2024-02-22 VITALS
WEIGHT: 145 LBS | HEART RATE: 85 BPM | RESPIRATION RATE: 18 BRPM | DIASTOLIC BLOOD PRESSURE: 56 MMHG | OXYGEN SATURATION: 100 % | SYSTOLIC BLOOD PRESSURE: 108 MMHG | BODY MASS INDEX: 24.75 KG/M2 | TEMPERATURE: 99 F | HEIGHT: 64 IN

## 2024-02-22 DIAGNOSIS — J06.9 UPPER RESPIRATORY TRACT INFECTION, UNSPECIFIED TYPE: ICD-10-CM

## 2024-02-22 DIAGNOSIS — J01.90 ACUTE SINUSITIS, RECURRENCE NOT SPECIFIED, UNSPECIFIED LOCATION: Primary | ICD-10-CM

## 2024-02-22 LAB
CTP QC/QA: YES
MOLECULAR STREP A: NEGATIVE
POC MOLECULAR INFLUENZA A AGN: NEGATIVE
POC MOLECULAR INFLUENZA B AGN: NEGATIVE
SARS-COV-2 RDRP RESP QL NAA+PROBE: NEGATIVE

## 2024-02-22 PROCEDURE — 87635 SARS-COV-2 COVID-19 AMP PRB: CPT | Performed by: PHYSICIAN ASSISTANT

## 2024-02-22 PROCEDURE — 87502 INFLUENZA DNA AMP PROBE: CPT

## 2024-02-22 PROCEDURE — 99283 EMERGENCY DEPT VISIT LOW MDM: CPT

## 2024-02-22 PROCEDURE — 87651 STREP A DNA AMP PROBE: CPT

## 2024-02-22 RX ORDER — AMOXICILLIN AND CLAVULANATE POTASSIUM 875; 125 MG/1; MG/1
1 TABLET, FILM COATED ORAL 2 TIMES DAILY
Qty: 14 TABLET | Refills: 0 | Status: SHIPPED | OUTPATIENT
Start: 2024-02-22

## 2024-02-23 NOTE — ED PROVIDER NOTES
"Encounter Date: 2/22/2024       History     Chief Complaint   Patient presents with    Sore Throat     With productive cough, HA, and nasal drainage since Sunday. Took benadryl today.      34-year-old female with past medical history of tetralogy of Fallot status post BT shunt at birth followed by total repair at 6 years old and VSD patch leak (she is being followed by cardiology) presents to ED today for evaluation of upper respiratory symptoms.  Patient reports symptoms of sore throat, runny nose, cough with thick mucus, nasal congestion, headache onset 4 days ago.  Reports possible positive sick contact exposure as she was recently out of town.  She has not attempted any medications for symptomatic relief so far.  Denies fever, vomiting, abdominal pain, chest pain, trouble breathing.    The history is provided by the patient. No  was used.     Review of patient's allergies indicates:   Allergen Reactions    Articaine-epinephrine bitart Hives and Swelling     Pt states face "swole up" with this local     Benzocaine Hives and Swelling     Pt states her face "swole up" with this local    Iodine and iodide containing products      Past Medical History:   Diagnosis Date    Murmur, cardiac      Past Surgical History:   Procedure Laterality Date    CARDIAC SURGERY       Family History   Problem Relation Age of Onset    No Known Problems Father      Social History     Tobacco Use    Smokeless tobacco: Never   Substance Use Topics    Alcohol use: Yes    Drug use: No     Review of Systems   Constitutional:  Negative for chills, fatigue and fever.   HENT:  Positive for congestion, rhinorrhea, sinus pressure, sinus pain and sore throat. Negative for dental problem, ear pain and sneezing.    Eyes:  Negative for visual disturbance.   Respiratory:  Positive for cough. Negative for shortness of breath.    Cardiovascular:  Negative for chest pain.   Gastrointestinal:  Negative for nausea.   Genitourinary:  " Negative for dysuria.   Musculoskeletal:  Negative for back pain.   Skin:  Negative for rash.   Neurological:  Positive for headaches. Negative for weakness.   Hematological:  Does not bruise/bleed easily.       Physical Exam     Initial Vitals [02/22/24 1838]   BP Pulse Resp Temp SpO2   (!) 108/56 85 18 99 °F (37.2 °C) 100 %      MAP       --         Physical Exam    Nursing note and vitals reviewed.  Constitutional: She appears well-developed and well-nourished. She is not diaphoretic. No distress.   HENT:   Head: Normocephalic and atraumatic.   Right Ear: External ear normal.   Left Ear: External ear normal.   Nose: Nose normal.   Mouth/Throat: Oropharynx is clear and moist. No oropharyngeal exudate.   Eyes: Conjunctivae are normal.   Neck:   Normal range of motion.  Cardiovascular:  Normal rate, regular rhythm and intact distal pulses.           Murmur heard.  Pulmonary/Chest: Breath sounds normal. No respiratory distress. She has no wheezes. She has no rhonchi. She has no rales.   Abdominal: She exhibits no distension.   Musculoskeletal:         General: No edema.      Cervical back: Normal range of motion.     Neurological: She is alert and oriented to person, place, and time. GCS score is 15. GCS eye subscore is 4. GCS verbal subscore is 5. GCS motor subscore is 6.   Skin: Skin is warm and dry.   Psychiatric: She has a normal mood and affect. Her behavior is normal.         ED Course   Procedures  Labs Reviewed   POCT STREP A MOLECULAR   POCT INFLUENZA A/B MOLECULAR   SARS-COV-2 RDRP GENE          Imaging Results    None          Medications - No data to display  Medical Decision Making  34-year-old female with past medical history of tetralogy of Fallot status post BT shunt at birth followed by total repair at 6 years old and VSD patch leak (she is being followed by cardiology) presents to ED today for evaluation of upper respiratory symptoms.  Patient reports symptoms of sore throat, runny nose, cough with  thick mucus, nasal congestion, headache onset 4 days ago.  Reports possible positive sick contact exposure as she was recently out of town.  She has not attempted any medications for symptomatic relief so far.  Denies fever, vomiting, abdominal pain, chest pain, trouble breathing. On physical exam lungs are clear to auscultation. Regular rate and rhythm. Patient is nontoxic appearing.  Patient is afebrile with regular respiratory rate, heart rate and normal oxygen saturation on room air.  Rapid COVID, strep and flu swab resulted negative.  Discussed symptoms likely due to sinus/upper respiratory infection.  Will start patient on Augmentin.  Patient is agreeable.  Return precautions discussed, otherwise follow up with primary care doctor.    Of note: Differential diagnosis to include but not limited to:  covid, flu, strep, viral URI, sinusitis    Rufina Lindquist PA-C    DISCLAIMER: This note was prepared with [employer] voice recognition transcription software. Garbled syntax, mangled pronouns, and other bizarre constructions may be attributed to that software system. If you have any questions regarding information in this note please contact me.           Amount and/or Complexity of Data Reviewed  Labs: ordered.    Risk  Prescription drug management.                                      Clinical Impression:  Final diagnoses:  [J01.90] Acute sinusitis, recurrence not specified, unspecified location (Primary)  [J06.9] Upper respiratory tract infection, unspecified type          ED Disposition Condition    Discharge Stable          ED Prescriptions       Medication Sig Dispense Start Date End Date Auth. Provider    amoxicillin-clavulanate 875-125mg (AUGMENTIN) 875-125 mg per tablet Take 1 tablet by mouth 2 (two) times daily. 14 tablet 2/22/2024 -- Rufina Lindquist PA-C          Follow-up Information       Follow up With Specialties Details Why Contact Info    [facility] [place] [facility] Emergency Medicine Go to  As needed, If  symptoms worsen [address] [facility] [place] [id number] [phone]             Rufina Lindquist PA-C  02/22/24 2136

## 2024-05-20 ENCOUNTER — HOSPITAL ENCOUNTER (EMERGENCY)
Facility: HOSPITAL | Age: 34
Discharge: HOME | End: 2024-05-20
Attending: STUDENT IN AN ORGANIZED HEALTH CARE EDUCATION/TRAINING PROGRAM
Payer: MEDICARE

## 2024-05-20 VITALS
WEIGHT: 140 LBS | OXYGEN SATURATION: 100 % | TEMPERATURE: 99 F | SYSTOLIC BLOOD PRESSURE: 124 MMHG | RESPIRATION RATE: 20 BRPM | DIASTOLIC BLOOD PRESSURE: 73 MMHG | HEIGHT: 64 IN | HEART RATE: 79 BPM | BODY MASS INDEX: 23.9 KG/M2

## 2024-05-20 DIAGNOSIS — A59.9 TRICHOMONAS INFECTION: ICD-10-CM

## 2024-05-20 DIAGNOSIS — N12 PYELONEPHRITIS: Primary | ICD-10-CM

## 2024-05-20 LAB
B-HCG UR QL: NEGATIVE
BACTERIA #/AREA URNS HPF: ABNORMAL /HPF
BACTERIA GENITAL QL WET PREP: ABNORMAL
BILIRUB UR QL STRIP: NEGATIVE
CLARITY UR: CLEAR
CLUE CELLS VAG QL WET PREP: ABNORMAL
COLOR UR: YELLOW
CTP QC/QA: YES
FILAMENT FUNGI VAG WET PREP-#/AREA: ABNORMAL
GLUCOSE UR QL STRIP: NEGATIVE
HGB UR QL STRIP: ABNORMAL
KETONES UR QL STRIP: NEGATIVE
LEUKOCYTE ESTERASE UR QL STRIP: ABNORMAL
MICROSCOPIC COMMENT: ABNORMAL
NITRITE UR QL STRIP: NEGATIVE
PH UR STRIP: 6 [PH] (ref 5–8)
PROT UR QL STRIP: NEGATIVE
RBC #/AREA URNS HPF: 2 /HPF (ref 0–4)
SP GR UR STRIP: 1.01 (ref 1–1.03)
SPECIMEN SOURCE: ABNORMAL
SQUAMOUS #/AREA URNS HPF: 3 /HPF
T VAGINALIS GENITAL QL WET PREP: ABNORMAL
URN SPEC COLLECT METH UR: ABNORMAL
UROBILINOGEN UR STRIP-ACNC: NEGATIVE EU/DL
WBC #/AREA URNS HPF: 7 /HPF (ref 0–5)
WBC #/AREA VAG WET PREP: ABNORMAL
YEAST GENITAL QL WET PREP: ABNORMAL

## 2024-05-20 PROCEDURE — 96372 THER/PROPH/DIAG INJ SC/IM: CPT | Performed by: PHYSICIAN ASSISTANT

## 2024-05-20 PROCEDURE — 63600175 PHARM REV CODE 636 W HCPCS: Performed by: PHYSICIAN ASSISTANT

## 2024-05-20 PROCEDURE — 87210 SMEAR WET MOUNT SALINE/INK: CPT

## 2024-05-20 PROCEDURE — 81000 URINALYSIS NONAUTO W/SCOPE: CPT

## 2024-05-20 PROCEDURE — 81025 URINE PREGNANCY TEST: CPT

## 2024-05-20 PROCEDURE — 87491 CHLMYD TRACH DNA AMP PROBE: CPT

## 2024-05-20 PROCEDURE — 99284 EMERGENCY DEPT VISIT MOD MDM: CPT | Mod: 25

## 2024-05-20 PROCEDURE — 25000003 PHARM REV CODE 250: Performed by: PHYSICIAN ASSISTANT

## 2024-05-20 PROCEDURE — 87086 URINE CULTURE/COLONY COUNT: CPT | Performed by: PHYSICIAN ASSISTANT

## 2024-05-20 RX ORDER — CEFDINIR 300 MG/1
300 CAPSULE ORAL 2 TIMES DAILY
Qty: 20 CAPSULE | Refills: 0 | Status: SHIPPED | OUTPATIENT
Start: 2024-05-20 | End: 2024-05-30

## 2024-05-20 RX ORDER — METRONIDAZOLE 500 MG/1
500 TABLET ORAL 2 TIMES DAILY
Qty: 14 TABLET | Refills: 0 | Status: SHIPPED | OUTPATIENT
Start: 2024-05-20 | End: 2024-05-27

## 2024-05-20 RX ORDER — ONDANSETRON 4 MG/1
4 TABLET, ORALLY DISINTEGRATING ORAL EVERY 6 HOURS PRN
Qty: 30 TABLET | Refills: 0 | Status: SHIPPED | OUTPATIENT
Start: 2024-05-20

## 2024-05-20 RX ADMIN — CEFTRIAXONE 1 G: 1 INJECTION, POWDER, FOR SOLUTION INTRAMUSCULAR; INTRAVENOUS at 10:05

## 2024-05-21 LAB
C TRACH DNA SPEC QL NAA+PROBE: NOT DETECTED
N GONORRHOEA DNA SPEC QL NAA+PROBE: NOT DETECTED

## 2024-05-21 NOTE — ED PROVIDER NOTES
"Encounter Date: 5/20/2024       History     Chief Complaint   Patient presents with    Vaginal Discharge     Pt presents to ER with complaints of vaginal discharge and burning when she urinates and abdominal pain times 3 days. Pt states she took pills for the abdominal pain. Pt denies any other issues at this time.       33yo F presents to ED complaining of 4 day history of pelvic cramping, dysuria.    She admits to history of similar symptoms related to UTIs in the past.  Pain has been intermittent, cramping in nature.  She admits to associated brown vaginal discharge after wiping after urination.  No vaginal bleeding.  Denies nausea vomiting.  No fever chills.  No change in appetite or intake.    LMP 3 weeks ago, admits to history of menorrhagia    She does state there is also possibly a chance of STI given new sexual partner.    Past medical history:  Tetralogy of Fallot s/p repair  Eczema  History of dizzy spells  Palpitations      Review of patient's allergies indicates:   Allergen Reactions    Articaine-epinephrine bitart Hives and Swelling     Pt states face "swole up" with this local     Benzocaine Hives and Swelling     Pt states her face "swole up" with this local    Iodine and iodide containing products      Past Medical History:   Diagnosis Date    Murmur, cardiac      Past Surgical History:   Procedure Laterality Date    CARDIAC SURGERY       Family History   Problem Relation Name Age of Onset    No Known Problems Father       Social History     Tobacco Use    Smokeless tobacco: Never   Substance Use Topics    Alcohol use: Yes    Drug use: No     Review of Systems   Constitutional:  Negative for appetite change, chills and fever.   Gastrointestinal:  Negative for abdominal pain, nausea and vomiting.   Genitourinary:  Positive for dysuria and vaginal discharge. Negative for frequency.   Musculoskeletal:  Negative for myalgias.   Neurological:  Negative for syncope.       Physical Exam     Initial Vitals " [05/20/24 2056]   BP Pulse Resp Temp SpO2   124/73 79 20 98.6 °F (37 °C) 100 %      MAP       --         Physical Exam    Nursing note and vitals reviewed.  Constitutional: She appears well-developed and well-nourished. She is not diaphoretic. No distress.   HENT:   Head: Normocephalic and atraumatic.   Neck: Neck supple.   Normal range of motion.  Pulmonary/Chest: No respiratory distress.   Healed sternotomy scar   Abdominal: Abdomen is soft. She exhibits no distension.   Very mild suprapubic tenderness.  Mild tenderness to right flank, right CVA region.   Musculoskeletal:         General: No tenderness. Normal range of motion.      Cervical back: Normal range of motion and neck supple.     Neurological: She is alert and oriented to person, place, and time. GCS score is 15. GCS eye subscore is 4. GCS verbal subscore is 5. GCS motor subscore is 6.   Skin: Skin is warm.   Psychiatric: She has a normal mood and affect. Thought content normal.         ED Course   Procedures  Labs Reviewed   VAGINAL SCREEN - Abnormal; Notable for the following components:       Result Value    Trichomonas Rare (*)     Bacteria - Vaginal Screen Moderate (*)     All other components within normal limits    Narrative:     Release to patient->Immediate   URINALYSIS, REFLEX TO URINE CULTURE - Abnormal; Notable for the following components:    Occult Blood UA 1+ (*)     Leukocytes, UA 1+ (*)     All other components within normal limits    Narrative:     Specimen Source->Urine   URINALYSIS MICROSCOPIC - Abnormal; Notable for the following components:    WBC, UA 7 (*)     All other components within normal limits    Narrative:     Specimen Source->Urine   CULTURE, URINE   C. TRACHOMATIS/N. GONORRHOEAE BY AMP DNA   POCT URINE PREGNANCY          Imaging Results    None          Medications   cefTRIAXone (Rocephin) 1 g in LIDOcaine HCL 10 mg/ml (1%) 4 mL IM only syringe (1 g Intramuscular Given 5/20/24 2228)     Medical Decision  Making  Differential diagnosis:  STI, UTI, PID, pyelonephritis    Amount and/or Complexity of Data Reviewed  Discussion of management or test interpretation with external provider(s): After discussion, we have elected to treat for UTI based on symptoms in urinalysis results.  Also positive for Trichomonas.  After discussion, given flank tenderness, CVA tenderness, we will treat as pyelonephritis based on positive urine culture results in the past.  Also started on Flagyl.  GC pending.  Urine culture pending.  Patient feels comfortable with discharge and outpatient follow-up.  Referral placed to establish care with a local gynecologist.  Discussed interim red flags reasons for return with patient.  She is comfortable current plan.    Risk  Prescription drug management.               ED Course as of 05/21/24 0404   Mon May 20, 2024   2214 Previous urine culture results from 05/02/2024 ED visit with 20,000 CFU/mL Streptococcus agalactiae, not enough to call it a true UTI. [SM]   2218 GC pending.  Previous positive urine culture with the coli growth. [SM]      ED Course User Index  [SM] Kwan Skinner PA-C                           Clinical Impression:  Final diagnoses:  [N12] Pyelonephritis (Primary)  [A59.9] Trichomonas infection          ED Disposition Condition    Discharge           ED Prescriptions       Medication Sig Dispense Start Date End Date Auth. Provider    metroNIDAZOLE (FLAGYL) 500 MG tablet Take 1 tablet (500 mg total) by mouth 2 (two) times daily. for 7 days 14 tablet 5/20/2024 5/27/2024 Kwan Skinner PA-C    cefdinir (OMNICEF) 300 MG capsule Take 1 capsule (300 mg total) by mouth 2 (two) times daily. for 10 days 20 capsule 5/20/2024 5/30/2024 Kwan Skinner PA-C    ondansetron (ZOFRAN-ODT) 4 MG TbDL Take 1 tablet (4 mg total) by mouth every 6 (six) hours as needed (Nausea). 30 tablet 5/20/2024 -- Kwan Skinner PA-C          Follow-up Information       Follow up With Specialties  Details Why Contact Info    Armin Gao MD Obstetrics and Gynecology, Obstetrics and Gynecology Schedule an appointment as soon as possible for a visit  To establish care, For reevaluation [address]  [address] [place], [place] [id number] [phone]               Kwan Skinner, PA-JUWAN  05/21/24 0404

## 2024-05-21 NOTE — DISCHARGE INSTRUCTIONS
Please follow-up and establish care with a local gynecologist using information provided.    Take both antibiotics as prescribed, try to take with meals to limit nausea.  Zofran as needed for any continued nausea.    Please return to this ED if you experience worsening abdominal pain, if you begin with nausea vomiting, if no improvement of symptoms after 48 hours of antibiotics, if any other problems occur.

## 2024-05-22 LAB — BACTERIA UR CULT: NORMAL

## 2024-08-26 ENCOUNTER — HOSPITAL ENCOUNTER (EMERGENCY)
Facility: HOSPITAL | Age: 34
Discharge: HOME | End: 2024-08-26
Attending: EMERGENCY MEDICINE
Payer: MEDICARE

## 2024-08-26 VITALS
BODY MASS INDEX: 23.9 KG/M2 | HEIGHT: 64 IN | RESPIRATION RATE: 16 BRPM | DIASTOLIC BLOOD PRESSURE: 64 MMHG | OXYGEN SATURATION: 100 % | HEART RATE: 75 BPM | WEIGHT: 140 LBS | TEMPERATURE: 98 F | SYSTOLIC BLOOD PRESSURE: 133 MMHG

## 2024-08-26 DIAGNOSIS — N83.202 CYST OF LEFT OVARY: ICD-10-CM

## 2024-08-26 DIAGNOSIS — R10.32 LEFT LOWER QUADRANT ABDOMINAL PAIN: Primary | ICD-10-CM

## 2024-08-26 LAB
ALBUMIN SERPL BCP-MCNC: 4.5 G/DL (ref 3.5–5.2)
ALP SERPL-CCNC: 76 U/L (ref 55–135)
ALT SERPL W/O P-5'-P-CCNC: 23 U/L (ref 10–44)
ANION GAP SERPL CALC-SCNC: 11 MMOL/L (ref 8–16)
AST SERPL-CCNC: 27 U/L (ref 10–40)
B-HCG UR QL: NEGATIVE
BASOPHILS # BLD AUTO: 0.03 K/UL (ref 0–0.2)
BASOPHILS NFR BLD: 0.5 % (ref 0–1.9)
BILIRUB SERPL-MCNC: 0.5 MG/DL (ref 0.1–1)
BILIRUB UR QL STRIP: NEGATIVE
BUN SERPL-MCNC: 9 MG/DL (ref 6–20)
CALCIUM SERPL-MCNC: 10.1 MG/DL (ref 8.7–10.5)
CHLORIDE SERPL-SCNC: 101 MMOL/L (ref 95–110)
CLARITY UR: CLEAR
CO2 SERPL-SCNC: 26 MMOL/L (ref 23–29)
COLOR UR: COLORLESS
CREAT SERPL-MCNC: 1.1 MG/DL (ref 0.5–1.4)
CTP QC/QA: YES
DIFFERENTIAL METHOD BLD: NORMAL
EOSINOPHIL # BLD AUTO: 0.2 K/UL (ref 0–0.5)
EOSINOPHIL NFR BLD: 3.4 % (ref 0–8)
ERYTHROCYTE [DISTWIDTH] IN BLOOD BY AUTOMATED COUNT: 13.7 % (ref 11.5–14.5)
EST. GFR  (NO RACE VARIABLE): >60 ML/MIN/1.73 M^2
GLUCOSE SERPL-MCNC: 76 MG/DL (ref 70–110)
GLUCOSE UR QL STRIP: NEGATIVE
HCT VFR BLD AUTO: 41.9 % (ref 37–48.5)
HGB BLD-MCNC: 13.5 G/DL (ref 12–16)
HGB UR QL STRIP: NEGATIVE
IMM GRANULOCYTES # BLD AUTO: 0.01 K/UL (ref 0–0.04)
IMM GRANULOCYTES NFR BLD AUTO: 0.2 % (ref 0–0.5)
KETONES UR QL STRIP: NEGATIVE
LEUKOCYTE ESTERASE UR QL STRIP: NEGATIVE
LIPASE SERPL-CCNC: 73 U/L (ref 4–60)
LYMPHOCYTES # BLD AUTO: 1.7 K/UL (ref 1–4.8)
LYMPHOCYTES NFR BLD: 30.3 % (ref 18–48)
MCH RBC QN AUTO: 27.4 PG (ref 27–31)
MCHC RBC AUTO-ENTMCNC: 32.2 G/DL (ref 32–36)
MCV RBC AUTO: 85 FL (ref 82–98)
MONOCYTES # BLD AUTO: 0.4 K/UL (ref 0.3–1)
MONOCYTES NFR BLD: 6.2 % (ref 4–15)
NEUTROPHILS # BLD AUTO: 3.4 K/UL (ref 1.8–7.7)
NEUTROPHILS NFR BLD: 59.4 % (ref 38–73)
NITRITE UR QL STRIP: NEGATIVE
NRBC BLD-RTO: 0 /100 WBC
PH UR STRIP: 7 [PH] (ref 5–8)
PLATELET # BLD AUTO: 268 K/UL (ref 150–450)
PMV BLD AUTO: 9.6 FL (ref 9.2–12.9)
POTASSIUM SERPL-SCNC: 4.4 MMOL/L (ref 3.5–5.1)
PROT SERPL-MCNC: 8.5 G/DL (ref 6–8.4)
PROT UR QL STRIP: NEGATIVE
RBC # BLD AUTO: 4.92 M/UL (ref 4–5.4)
SODIUM SERPL-SCNC: 138 MMOL/L (ref 136–145)
SP GR UR STRIP: <1.005 (ref 1–1.03)
URN SPEC COLLECT METH UR: ABNORMAL
UROBILINOGEN UR STRIP-ACNC: NEGATIVE EU/DL
WBC # BLD AUTO: 5.65 K/UL (ref 3.9–12.7)

## 2024-08-26 PROCEDURE — 83690 ASSAY OF LIPASE: CPT | Performed by: NURSE PRACTITIONER

## 2024-08-26 PROCEDURE — 81003 URINALYSIS AUTO W/O SCOPE: CPT | Performed by: NURSE PRACTITIONER

## 2024-08-26 PROCEDURE — 25000003 PHARM REV CODE 250

## 2024-08-26 PROCEDURE — 85025 COMPLETE CBC W/AUTO DIFF WBC: CPT | Performed by: NURSE PRACTITIONER

## 2024-08-26 PROCEDURE — 96375 TX/PRO/DX INJ NEW DRUG ADDON: CPT

## 2024-08-26 PROCEDURE — 80053 COMPREHEN METABOLIC PANEL: CPT | Performed by: NURSE PRACTITIONER

## 2024-08-26 PROCEDURE — 96374 THER/PROPH/DIAG INJ IV PUSH: CPT

## 2024-08-26 PROCEDURE — 99285 EMERGENCY DEPT VISIT HI MDM: CPT | Mod: 25

## 2024-08-26 PROCEDURE — 96361 HYDRATE IV INFUSION ADD-ON: CPT

## 2024-08-26 PROCEDURE — 63600175 PHARM REV CODE 636 W HCPCS

## 2024-08-26 PROCEDURE — 81025 URINE PREGNANCY TEST: CPT | Performed by: NURSE PRACTITIONER

## 2024-08-26 RX ORDER — KETOROLAC TROMETHAMINE 30 MG/ML
15 INJECTION, SOLUTION INTRAMUSCULAR; INTRAVENOUS
Status: COMPLETED | OUTPATIENT
Start: 2024-08-26 | End: 2024-08-26

## 2024-08-26 RX ORDER — NAPROXEN 500 MG/1
500 TABLET ORAL 2 TIMES DAILY
Qty: 20 TABLET | Refills: 0 | Status: SHIPPED | OUTPATIENT
Start: 2024-08-26

## 2024-08-26 RX ORDER — MORPHINE SULFATE 4 MG/ML
4 INJECTION, SOLUTION INTRAMUSCULAR; INTRAVENOUS
Status: COMPLETED | OUTPATIENT
Start: 2024-08-26 | End: 2024-08-26

## 2024-08-26 RX ADMIN — KETOROLAC TROMETHAMINE 15 MG: 30 INJECTION, SOLUTION INTRAMUSCULAR at 07:08

## 2024-08-26 RX ADMIN — MORPHINE SULFATE 4 MG: 4 INJECTION, SOLUTION INTRAMUSCULAR; INTRAVENOUS at 09:08

## 2024-08-26 RX ADMIN — SODIUM CHLORIDE 1000 ML: 9 INJECTION, SOLUTION INTRAVENOUS at 07:08

## 2024-08-26 NOTE — Clinical Note
"Otilia Freeman"Marita" Cooks was seen and treated in our emergency department on 8/26/2024.  She may return to work on 08/28/2024.       If you have any questions or concerns, please don't hesitate to call.      Reed Reyes PA-C"

## 2024-08-26 NOTE — Clinical Note
"Otilia Freeman"Marita" Cooks was seen and treated in our emergency department on 8/26/2024.  She may return to work on 08/28/2024.       If you have any questions or concerns, please don't hesitate to call.      Reed Reyes PA-C" [facility] ENCOUNTER  GI CONSULT NOTE    REQUESTING PROVIDER:  Nayan Scott MD    CHIEF COMPLAINT:  Consultation (MRI showed enlarged liver)       SUBJECTIVE:    Iglesia Banda is a 62 year old male seen today at the request of Nayan Scott MD for who is being seen today for enlarged liver.  Patient is past medical history pertinent for stage IIB resected non-small cell cancer lung status post resection  complicated by right-sided pleural effusion status post decortication and pleurodesis  History also pertinent for prior history of smoking with COPD, sleep disturbance, coronary artery disease status post CABG.    Patient had an MRI abdomen last year which was notable for mild hepatomegaly and a subtle sub cm abnormal signal focus close to the neck in gallbladder suggestive of sludge  Polyp. He had a repeat MRI and showed enlarged liver.    No family history of liver disease. No history of jaundice. No abdominal pain but has some fullness in right upper abdomen with episodic sharp pain. He stats he has had that issues since lung surgery.    He smokes 1/2 pack day. No alcohol use. NO NSAID use.    He worked as a  in [employer].    He reports a 10 lbs weight loss in last 3 weeks.          MEDICATIONS:       Current Outpatient Medications   Medication Sig Dispense Refill   • HYDROcodone-acetaminophen (NORCO) 5-325 MG per tablet Week 1: Take 1 tablet by mouth every 4 hours as needed for pain (no more than 6 tabs/day).  Week 2: Take 1 tablet every 4-6 hours as needed (no more than 5 tabs/day). Week 3: Take 1 tablet by mouth every 6 hours as needed (no more than 4 tabs/day). Week 4: Take 1 tablet by mouth every 8 hours as needed (no more than 3 tabs/day). 100 tablet 0   • tiZANidine (ZANAFLEX) 2 MG tablet Take 1-2 tablets by mouth 3 times daily as needed for Muscle spasms. 30 tablet 0   • ONE TOUCH ULTRA TEST test strip      • Blood Glucose Calibration (OT ULTRA/FASTTK CNTRL SOLN)  Solution      • isosorbide mononitrate (IMDUR) 30 MG 24 hr tablet Take 3 tablets by mouth daily. Do not start before December 7, 2019. 90 tablet 0   • polyethylene glycol (MIRALAX) powder Mix 17 grams in 8 ounces of liquid and drink by mouth daily. Do not start before December 7, 2019. 476 g 2   • torsemide (DEMADEX) 20 MG tablet Take 1 tablet by mouth daily. Do not start before December 7, 2019. 90 tablet 0   • DULoxetine (CYMBALTA) 30 MG capsule Take 30 mg by mouth daily.     • ferrous sulfate 325 (65 FE) MG tablet Take 325 mg by mouth daily (with breakfast).     • traZODone (DESYREL) 100 MG tablet Take 100 mg by mouth nightly.     • LINZESS 145 MCG capsule TAKE 1 Capsule BY MOUTH EVERY DAY AS NEEDED FOR CONSTIPATION 30 MINUTES prior to meal  0   • glipiZIDE (GLUCOTROL) 5 MG tablet Take 5 mg by mouth 2 times daily (before meals).      • dilTIAZem (TIAZAC) 360 MG 24 hr capsule Take 360 mg by mouth daily.     • ascorbic acid (VITAMIN C) 250 MG tablet Take 250 mg by mouth daily. 3/25/19:  Dose unknown     • hydrALAZINE (APRESOLINE) 100 MG tablet Take 1 tablet by mouth 3 times daily. 270 tablet 1   • linaGLIPtin (TRADJENTA) 5 MG tablet Take 5 mg by mouth daily.      • aspirin 81 MG chewable tablet Chew 1 tablet by mouth daily. 30 tablet 1   • atorvastatin (LIPITOR) 40 MG tablet Take 1 tablet by mouth nightly. 30 tablet 6   • clopidogrel (PLAVIX) 75 MG tablet Take 1 tablet by mouth daily. 30 tablet 4   • nitroGLYcerin (NITROSTAT) 0.4 MG sublingual tablet Place 1 tablet under the tongue every 5 minutes as needed for Chest pain. 25 tablet 3   • gabapentin (NEURONTIN) 300 MG capsule Take 1 capsule by mouth at bedtime for 7 days, THEN 1 capsule 2 times daily for 7 days, THEN 1 capsule 3 times daily for 7 days. 42 capsule 0     No current facility-administered medications for this visit.        PROBLEM LIST:                  Patient Active Problem List   Diagnosis   • CKD (chronic kidney disease) stage 3, GFR 30-59 ml/min  (CMS/[facility])   • Coronary artery disease involving native coronary artery of native heart without angina pectoris   • Essential hypertension, benign   • DM (diabetes mellitus) (CMS/[facility])   • Recurrent major depression-severe (CMS/[facility])   • Atherosclerosis of coronary artery of native heart without angina pectoris   • Arthritis of left knee   • Lung nodule   • Hypersomnia with sleep apnea   • Malignant neoplasm of overlapping sites of right lung (CMS/[facility])   • s/p robotic redo right VATS right pleural biopsy, pleural effusion, talc pleurodesis and drainage of effusion   • Primary osteoarthritis of both knees   • Right-sided thoracic back pain       HISTORIES:    ALLERGIES:   Allergen Reactions   • Aspirin NAUSEA   • Tylenol Other (See Comments)     Patient reports \"possible harm to kidneys with Tylenol\"     Past Medical History:   Diagnosis Date   • Arthritis of left knee 4/12/2018   • Asbestos exposure    • CAP (community acquired pneumonia) 11/9/2013   • Chest pain    • CKD (chronic kidney disease) stage 3, GFR 30-59 ml/min (CMS/[facility]) 11/9/2013   • COPD (chronic obstructive pulmonary disease) (CMS/[facility])     emphysema   • Coronary artery disease     Dr. Phillip   • Depression     pt. states hes attempted suicide in the past,     • Diabetes mellitus (CMS/[facility])    • Essential (primary) hypertension    • History of substance abuse (CMS/[facility]) 12/20/2018    Was getting treatment for Cocaine abuse August of 2018   • Lung nodule     Right middle lobe   • Malignant neoplasm (CMS/[facility]) 02/05/2019    Adenocarcinoma, poorly differentiated, consistent with lung primary.     • Myocardial infarction (CMS/[facility]) 2011   • Osteomyelitis (CMS/[facility]) 05/06/2017    Right second toe    • Pancreatitis    • Pulmonary hypertension (CMS/[facility])    • s/p robotic right plerual biopsy, pleural effusion, talc pleurodesis and drainage of effusion 12/31/2019   • SOB (shortness of breath) on exertion    • Wears dentures     upper denture/lower partial   • Wears  glasses      Past Surgical History:   Procedure Laterality Date   • Cardiac catherization  07/25/2013    RCA 70%, LIMA->LAD severe disease   • Cardiac surgery  01/28/2011    CABG x 4 vessels done at [facility]   • Cysto irr/evac/mul/obst/clots  03/14/2019    Dr Anthony Byers   • Endobronchl ultrasnd/brnchscop  01/29/2019    biopsies nondiagnostic   • Ir lung biopsy Right 02/05/2019    Adenocarcinoma, poorly differentiated, consistent with lung primary.     • Lung decortication Right 12/31/2019    s/p robotic redo right VATS right pleural biopsy, pleural effusion, talc pleurodesis and drainage of effusion   • Lung removal, partial Right 03/2019   • Ptca  05/26/2017    stent DALY->LAD    Josette   • Synvisc Bilateral 04/24/2019    Synvisc One, series knees   • Thoracentesis  11/28/2019    x2   • Tonsillectomy  1970     Social History     Tobacco Use   • Smoking status: Current Every Day Smoker     Packs/day: 0.50     Years: 45.00     Pack years: 22.50     Types: Cigarettes   • Smokeless tobacco: Never Used   Substance Use Topics   • Alcohol use: No     Frequency: Never     Drinks per session: 1 or 2     Binge frequency: Never     Comment: stopped drinking in 1992   • Drug use: Yes     Frequency: 1.0 times per week     Types: Cocaine     Comment: last used summer 2018 followed by Rehab        Family History   Problem Relation Age of Onset   • Hypertension Brother    • Heart disease Brother    • Psychiatric Brother    • Diabetes Father    • Heart disease Father    :    REVIEW OF SYSTEMS:    All other systems are reviewed and are negative except as documented in the history of present illness.    PHYSICAL EXAM:    Vital Signs: Weight 122.1 kg.  General: The patient is well developed obese, in no acute distress, appears stated age.   Neurologic: Alert. Normal mood and affect, normal speech, no gross tremor.  Skin: Warm and dry, normal turgor.  Head: Normocephalic.  Eyes: Normal conjunctivae and sclerae.  Pupils equal, round,  reactive to light.  Extraocular movements intact.  HEENT: Oropharynx clear, moist mucous membranes, external nose is normal to inspection.  Neck: Symmetric without swelling or tenderness.   Respiratory: Respiratory effort normal.   Cardiovascular: Regular rate and rhythm.  Extremities: No swelling or tenderness.  Gastrointestinal:  Soft and n right upper quadrant tender without guarding or rigidity.  Normal bowel sounds.  No hepatomegaly or splenomegaly.       LABORATORY DATA:    Lab Results   Component Value Date    SODIUM 140 02/04/2020    POTASSIUM 3.9 02/04/2020    CHLORIDE 107 02/04/2020    CO2 29 02/04/2020    BUN 33 (H) 02/04/2020    CREATININE 2.29 (H) 02/04/2020    GLUCOSE 155 (H) 02/04/2020     Lab Results   Component Value Date    WBC 5.2 02/04/2020    HCT 29.0 (L) 02/04/2020    HGB 9.0 (L) 02/04/2020     02/04/2020     Lab Results   Component Value Date    AST 12 02/04/2020    GPT 27 02/04/2020    ALKPT 101 02/04/2020    BILIRUBIN 0.3 02/04/2020       IMAGING STUDIES:    IMPRESSION:   1. Mild Hepatomegaly. Correlation with serum LFTs advised.   2. Imaged sections of chest shows mild effusion in right hemithorax.    Received results of: MRI ABD WO Contrast  DOS: 8/14/2019  Facility: [facility]     DOS: 5/22/2020  Facility: [facility]  Will be placed for review by: Rochelle Freeman  Requested images. Scanned.     IMPRESSION:   1. Mild Hepatomegaly. Correlation with serum LFTs advised.   2. Imaged sections of chest shows mild effusion in right hemithorax.           ENDOSCOPY:  Last colonoscopy about 10 years per patient    ASSESSMENT:     62-year-old male with past medical history pertinent for non-small-cell cancer lung status post resection complicated by pleural effusion status post decortication and pleurodesis, history of score E artery disease status post CABG, COPD, diabetes who on abdominal MRI was noted to have mild hepatomegaly which was noted last year as well as this year.   Questionable gallbladder neck polyp noted last year was not seen on MRI this year.  The exact report is not available to us at this time and will be obtained.  We will check LFT.  Will also check baseline CBC and BMP given prior history of anemia along with iron levels and hemoglobin A1c.  Most likely fatty liver without any evidence of hepatic decompensation but will correlate with LFTs and if required further workup will be carried out as per that.    Patient informs me that he had colonoscopy few years but unsure on details    PLAN:    1. Labs including LFT, CBC, iron profile and hemoglobin A1c  2. Obtain MRI reports  3. Check when patient had last screening colonoscopy   follow up with MD PRN   Instructions provided as documented in the after visit summary.    The patient indicated understanding of the diagnosis and agreed with the plan of care.      A copy of this note was sent to the referring provider. Thank you for involving me   in the care of this patient.

## 2024-08-27 NOTE — DISCHARGE INSTRUCTIONS

## 2024-08-27 NOTE — ED TRIAGE NOTES
Pt reports LLQ abd pain that radiates to L inner thigh and L flank accompanied with constipation x1 week. Pt reports that she normally has a BM every day.

## 2024-08-27 NOTE — ED PROVIDER NOTES
"Encounter Date: 8/26/2024       History     Chief Complaint   Patient presents with    Abdominal Pain     Pt reports to Ed for left lower abdominal for 1 week with flank pain. Pt endorses unable to have a bowel movement in last week. Pt endorses pain with urination.      34-year-old female with no past medical history presents to ED for emergent evaluation of left lower quadrant abdominal pain that radiates to her left lumbar back and down her left lower extremity with associated dysuria that started one-week ago.  She denies any associated trauma, fall, head trauma, LOC.  She denies any attempted treatment.  Her last bowel movement was a few days ago.  She is still passing flatulence.  She denies any fever, chills, chest pain, shortness of breath, nausea, vomiting, diarrhea, hematuria.  Her last menstrual period was 2 weeks ago.  No other symptoms reported.    The history is provided by the patient. No  was used.     Review of patient's allergies indicates:   Allergen Reactions    Articaine-epinephrine bitart Hives and Swelling     Pt states face "swole up" with this local     Benzocaine Hives and Swelling     Pt states her face "swole up" with this local    Iodine and iodide containing products      Past Medical History:   Diagnosis Date    Murmur, cardiac      Past Surgical History:   Procedure Laterality Date    CARDIAC SURGERY       Family History   Problem Relation Name Age of Onset    No Known Problems Father       Social History     Tobacco Use    Smokeless tobacco: Never   Substance Use Topics    Alcohol use: Yes    Drug use: No     Review of Systems   Constitutional:  Negative for chills and fever.   HENT:  Negative for congestion, ear pain, rhinorrhea and sore throat.    Eyes:  Negative for redness.   Respiratory:  Negative for cough and shortness of breath.    Cardiovascular:  Negative for chest pain.   Gastrointestinal:  Positive for abdominal pain. Negative for diarrhea, nausea and " vomiting.   Genitourinary:  Positive for dysuria. Negative for decreased urine volume, difficulty urinating, frequency, hematuria, pelvic pain, urgency, vaginal bleeding, vaginal discharge and vaginal pain.   Musculoskeletal:  Negative for back pain and neck pain.   Skin:  Negative for rash.   Neurological:  Negative for headaches.   Psychiatric/Behavioral:  Negative for confusion.        Physical Exam     Initial Vitals [08/26/24 1808]   BP Pulse Resp Temp SpO2   113/65 76 18 99.3 °F (37.4 °C) 99 %      MAP       --         Physical Exam    Nursing note and vitals reviewed.  Constitutional: She appears well-developed and well-nourished.  Non-toxic appearance. She does not appear ill.   HENT:   Head: Normocephalic and atraumatic.   Right Ear: Hearing, tympanic membrane, external ear and ear canal normal. Tympanic membrane is not perforated, not erythematous and not bulging.   Left Ear: Hearing, tympanic membrane, external ear and ear canal normal. Tympanic membrane is not perforated, not erythematous and not bulging.   Nose: Nose normal.   Mouth/Throat: Uvula is midline, oropharynx is clear and moist and mucous membranes are normal.   Eyes: Conjunctivae and EOM are normal.   Neck: Neck supple.   Normal range of motion.   Full passive range of motion without pain.     Cardiovascular:  Normal rate and regular rhythm.           Pulses:       Radial pulses are 2+ on the right side and 2+ on the left side.   Pulmonary/Chest: Effort normal and breath sounds normal. No accessory muscle usage. No respiratory distress. She has no decreased breath sounds.   Abdominal: Abdomen is soft. Bowel sounds are normal. She exhibits no distension. There is abdominal tenderness in the left lower quadrant.   No right CVA tenderness.  No left CVA tenderness. There is no rebound and no guarding.   Musculoskeletal:         General: Normal range of motion.      Cervical back: Full passive range of motion without pain, normal range of motion  and neck supple. No rigidity.     Neurological: She is alert. No cranial nerve deficit.   Neuro intact.  Strength and sensation intact bilateral upper and lower extremities.   Skin: Skin is warm and dry.   Psychiatric: She has a normal mood and affect.         ED Course   Procedures  Labs Reviewed   COMPREHENSIVE METABOLIC PANEL - Abnormal       Result Value    Sodium 138      Potassium 4.4      Chloride 101      CO2 26      Glucose 76      BUN 9      Creatinine 1.1      Calcium 10.1      Total Protein 8.5 (*)     Albumin 4.5      Total Bilirubin 0.5      Alkaline Phosphatase 76      AST 27      ALT 23      eGFR >60      Anion Gap 11     LIPASE - Abnormal    Lipase 73 (*)    URINALYSIS, REFLEX TO URINE CULTURE - Abnormal    Specimen UA Urine, Clean Catch      Color, UA Colorless (*)     Appearance, UA Clear      pH, UA 7.0      Specific Gravity, UA <1.005 (*)     Protein, UA Negative      Glucose, UA Negative      Ketones, UA Negative      Bilirubin (UA) Negative      Occult Blood UA Negative      Nitrite, UA Negative      Urobilinogen, UA Negative      Leukocytes, UA Negative      Narrative:     Specimen Source->Urine   CBC W/ AUTO DIFFERENTIAL    WBC 5.65      RBC 4.92      Hemoglobin 13.5      Hematocrit 41.9      MCV 85      MCH 27.4      MCHC 32.2      RDW 13.7      Platelets 268      MPV 9.6      Immature Granulocytes 0.2      Gran # (ANC) 3.4      Immature Grans (Abs) 0.01      Lymph # 1.7      Mono # 0.4      Eos # 0.2      Baso # 0.03      nRBC 0      Gran % 59.4      Lymph % 30.3      Mono % 6.2      Eosinophil % 3.4      Basophil % 0.5      Differential Method Automated     POCT URINE PREGNANCY    POC Preg Test, Ur Negative       Acceptable Yes            Imaging Results              CT Abdomen Pelvis  Without Contrast (Final result)  Result time 08/26/24 20:27:56      Final result by Alfredito Mosley MD (08/26/24 20:27:56)                   Impression:      1. Suspected right  hydrosalpinx.  Questionable left ovarian/adnexal 3.5 cm cystic lesion or hydrosalpinx.  2. No additional acute intra-abdominal abnormalities identified.      Electronically signed by: Alfredito Mosley MD  Date:    08/26/2024  Time:    20:27               Narrative:    EXAMINATION:  CT ABDOMEN PELVIS WITHOUT CONTRAST    CLINICAL HISTORY:  LLQ abdominal pain;    TECHNIQUE:  Low dose axial images, sagittal and coronal reformations were obtained from the lung bases to the pubic symphysis.  Oral contrast was not administered.    COMPARISON:  CT abdomen and pelvis from May 2014.    FINDINGS:  The visualized portion of the heart is unremarkable.  The lung bases are clear.    No significant hepatic abnormality seen on this noncontrast exam.  There is no intra-or extrahepatic biliary ductal dilatation.  The gallbladder is unremarkable.  The stomach, pancreas, spleen, and adrenal glands are unremarkable.    Kidneys show no evidence of stones or hydronephrosis. Ureters are difficult to track distally.  Urinary bladder is nondistended.  Uterus is unremarkable.  There is a 3.5 cm ovarian/adnexal cystic lesion seen.  There is serpiginous tubular fluid density structure seen in the right adnexal region which may represent possible hydrosalpinx.    Appendix is visualized and is unremarkable.  The visualized loops of small and large bowel show no evidence of obstruction or inflammation.  Moderate volume retained stool is seen within the colon.  No free air or free fluid.    Aorta tapers normally.    No acute osseous abnormality identified. Subcutaneous soft tissues show no significant abnormalities.                                       Medications   ketorolac injection 15 mg (15 mg Intravenous Given 8/26/24 1950)   sodium chloride 0.9% bolus 1,000 mL 1,000 mL (0 mLs Intravenous Stopped 8/26/24 2058)   morphine injection 4 mg (4 mg Intravenous Given 8/26/24 2109)     Medical Decision Making  This is a 34-year-old female with no past  medical history presents to ED for emergent evaluation of left lower quadrant abdominal pain that radiates to her left lumbar back and down her left lower extremity with associated dysuria that started one-week ago.  She denies any associated trauma, fall, head trauma, LOC.  She denies any attempted treatment.  Her last bowel movement was a few days ago.  She is still passing flatulence.  She denies any fever, chills, chest pain, shortness of breath, nausea, vomiting, diarrhea, hematuria.  Her last menstrual period was 2 weeks ago.  No other symptoms reported.    On physical exam, patient is well-appearing and in no acute distress.  Nontoxic appearing.  Lungs are clear to auscultation bilaterally.  Abdomen is soft. Mild tenderness to LLQ abdomen.  No guarding, rigidity, rebound.  2+ radial pulses bilaterally.  Posterior oropharynx is not erythematous.  No edema or exudate.  Uvula midline.  Bilateral tympanic membrane is normal.  No erythema, bulging, or perforations.  Neuro intact.  Strength and sensation intact bilateral upper and lower extremities. No CVA tenderness bilaterally. UPT negative.  Toradol and fluids ordered.  Will reassess.  CBC without evidence of any leukocytosis or anemia.  CMP revealed total protein 8.5.  Otherwise no other electrolyte abnormality.  Lipase 73.  Doubt pancreatitis.  UA unremarkable.  Doubt cystitis, pyelonephritis.  UPT negative.  Upon reassessment, patient still reports pain.  Morphine ordered.  Will reassess.  CT revealed:       1. Suspected right hydrosalpinx.  Questionable left ovarian/adnexal 3.5 cm cystic lesion or hydrosalpinx.  2. No additional acute intra-abdominal abnormalities identified.      Upon reassessment, patient reports relief to her symptoms.  Will discharge patient on anti-inflammatories.  Ambulatory referral to OBGYN ordered.  Urged prompt follow-up with OBGYN and PCP for further evaluation.    Strict return precautions given. I discussed with the  patient/family the diagnosis, treatment plan, indications for return to the emergency department, and for expected follow-up. The patient/family verbalized an understanding. The patient/family is asked if there are any questions or concerns. We discuss the case, until all issues are addressed to the patient/family's satisfaction. Patient/family understands and is agreeable to the plan. Patient is stable and ready for discharge.      Amount and/or Complexity of Data Reviewed  Radiology: ordered.    Risk  Prescription drug management.                                      Clinical Impression:  Final diagnoses:  [R10.32] Left lower quadrant abdominal pain (Primary)  [N83.202] Cyst of left ovary          ED Disposition Condition    Discharge Stable          ED Prescriptions       Medication Sig Dispense Start Date End Date Auth. Provider    naproxen (NAPROSYN) 500 MG tablet Take 1 tablet (500 mg total) by mouth 2 (two) times daily. 20 tablet 8/26/2024 -- Reed Reyes PA-C          Follow-up Information       Follow up With Specialties Details Why Contact Info    [facility] -  Schedule an appointment as soon as possible for a visit in 2 days for further evaluation [address] [place] [address]  [phone]      [facility] Emergency Dept Emergency Medicine In 2 days If symptoms worsen [address]  [facility] [place] [id number] [phone]             Reed Reyes PA-C  08/26/24 2105

## 2024-09-04 NOTE — PROGRESS NOTES
HISTORY OF PRESENT ILLNESS:    Marita Deneen Cooks is a 34 y.o. female, , Patient's last menstrual period was 2024 (approximate).,  presents for a routine exam. She uses nothing for contraception. She does not want STD screening, recently done at PCP.  Reports GYN complaints.    Reports monthly, irregular, painful heavy cycles. States cycles do not always occur at regular intervals, but are every month. Notes difficulty conceiving, and is TTC. Reports dyspareunia.   She reports 2 miscarriages. Reports h/o STD. Past US shows ovarian cysts and uterine fibroid.   States she has tried OCPs once around age 16, did not help with cycle control, only tried for 2 months.   The patient participates in regular exercise: yes.  The patient does not smoke.  The patient wears seatbelts.   Pt denies any domestic violence. No -  tattoos or blood transfusions    SCREENING:   Pap: NONE ON FILE   Mammogram: N/A  Colonoscopy: N/A   DEXA:  N/A     GYN FH:  - [facility]- Ovarian cancer  - Mom: stomach cancer, ?? Maybe ovarian or uterine     Past Medical History:   Diagnosis Date    Murmur, cardiac        Past Surgical History:   Procedure Laterality Date    CARDIAC SURGERY         MEDICATIONS AND ALLERGIES:      Current Outpatient Medications:     albuterol (PROVENTIL/VENTOLIN HFA) 90 mcg/actuation inhaler, Inhale 2 puffs into the lungs every 6 (six) hours as needed for Wheezing or Shortness of Breath. Rescue, Disp: 1 Inhaler, Rfl: 0    amitriptyline (ELAVIL) 50 MG tablet, Take 50 mg by mouth every evening., Disp: , Rfl:     erythromycin (ROMYCIN) ophthalmic ointment, Place a 1/2 inch ribbon of ointment into the lower eyelid 3 to 4 times a day for 5 days., Disp: 3.5 g, Rfl: 0    furosemide (LASIX) 20 MG tablet, Take 20 mg by mouth., Disp: , Rfl:     gabapentin (NEURONTIN) 300 MG capsule, Take by mouth., Disp: , Rfl:     ibuprofen (ADVIL,MOTRIN) 800 MG tablet, Take 1 tablet (800 mg total) by mouth every 6 (six) hours as needed.,  "Disp: 12 tablet, Rfl: 0    loratadine (CLARITIN) 10 mg tablet, Take 1 tablet (10 mg total) by mouth once daily., Disp: 30 tablet, Rfl: 0    metoprolol tartrate (LOPRESSOR) 50 MG tablet, , Disp: , Rfl: 3    naltrexone (DEPADE) 50 mg tablet, Take 25 mg by mouth., Disp: , Rfl:     naproxen (NAPROSYN) 500 MG tablet, Take 1 tablet (500 mg total) by mouth 2 (two) times daily., Disp: 20 tablet, Rfl: 0    ondansetron (ZOFRAN-ODT) 4 MG TbDL, Take 1 tablet (4 mg total) by mouth every 6 (six) hours as needed (Nausea)., Disp: 30 tablet, Rfl: 0    promethazine (PHENERGAN) 12.5 MG Tab, Take 1 tablet (12.5 mg total) by mouth 4 (four) times daily., Disp: 20 tablet, Rfl: 0    Review of patient's allergies indicates:   Allergen Reactions    Articaine-epinephrine bitart Hives and Swelling     Pt states face "swole up" with this local     Benzocaine Hives and Swelling     Pt states her face "swole up" with this local    Iodine and iodide containing products        Social History     Socioeconomic History    Marital status: Unknown   Tobacco Use    Smokeless tobacco: Never   Substance and Sexual Activity    Alcohol use: Yes    Drug use: No    Sexual activity: Yes   Social History Narrative    ** Merged History Encounter **            COMPREHENSIVE GYN HISTORY:    PAP History: Denies abnormal Paps.  Infection History: + STDs. Denies PID.  Benign History: + uterine fibroids. + ovarian cysts. Denies endometriosis. Denies other conditions.  Cancer History: Denies cervical cancer. Denies uterine cancer or hyperplasia. Denies ovarian cancer. Denies vulvar cancer or pre-cancer. Denies vaginal cancer or pre-cancer. Denies breast cancer. Denies colon cancer.  Sexual Activity History: Reports currently being sexually active  Menstrual History: Monthly, mild-moderate.  Contraception:no method    ROS:  GENERAL: No weight changes. No swelling. No fatigue. No fever.  CARDIOVASCULAR: No chest pain. No shortness of breath. No leg cramps. "   NEUROLOGICAL: No headaches. No vision changes.  BREASTS: No pain. No lumps. No discharge.  ABDOMEN: No pain. No nausea. No vomiting. No diarrhea. No constipation.  REPRODUCTIVE: No abnormal bleeding.   VULVA: No pain. No lesions. No itching.  VAGINA: No relaxation. No itching. No odor. No discharge. No lesions.  URINARY: No incontinence. No nocturia. No frequency. No dysuria.    /62   Wt 63 kg (138 lb 14.2 oz)   LMP 08/06/2024 (Approximate)   BMI 23.84 kg/m²     PE:  APPEARANCE: Well nourished, well developed, in no acute distress.  AFFECT: WNL, alert and oriented x 3.  SKIN: No acne or hirsutism.  NECK: Neck symmetric, without masses or thyromegaly.  NODES: No inguinal, cervical, axillary or femoral lymph node enlargement.  CHEST: Good respiratory effort.   ABDOMEN: Soft. No tenderness or masses. No hepatosplenomegaly. No hernias.  BREASTS: Symmetrical, no skin changes, visible lesions, palpable masses or nipple discharge bilaterally.  PELVIC: External female genitalia without lesions.  Female hair distribution. Adequate perineal body, Normal urethral meatus. Vagina moist and well rugated without lesions or discharge.  No significant cystocele or rectocele present. Cervix pink without lesions, discharge or tenderness. Uterus is normal size, regular, mobile and +tender. Adnexa without masses or tenderness.  EXTREMITIES: No edema      DIAGNOSIS:  1. Women's annual routine gynecological examination        2. Screening breast examination        3. Encounter for Papanicolaou smear for cervical cancer screening  Liquid-Based Pap Smear, Screening      4. Screening for human papillomavirus (HPV)  HPV High Risk Genotypes, PCR      5. Cyst of ovary, unspecified laterality  US Pelvis Comp with Transvag NON-OB (xpd      6. Encounter for screening examination for sexually transmitted disease        7. History of sexually transmitted disease  CANCELED: Treponema Pallidium Antibodies IgG, IgM    CANCELED: Hepatitis B  Surface Antigen    CANCELED: Hepatitis C Antibody    CANCELED: HIV 1/2 Ag/Ab (4th Gen)    CANCELED: Vaginosis Screen by DNA Probe    CANCELED: C. trachomatis/N. gonorrhoeae by AMP DNA      8. Uterine leiomyoma, unspecified location            PLAN:  - Pap and HPV done today.  - Screening tests as ordered.  - Diet and exercise encouraged.  Seat belt use encouraged.  Reviewed ASCCP Pap guidelines and screening recommendations.    Counseling: indications for and frequency of periodic gynecologic exam    Encounter for preconception consultation  - Instructed patient to begin prenatal vitamin, not currently on any teratogenic medications.   - Instructed her to track cycle, and to have intercourse on the days surrounding ovulation day (14 days before beginning of cycle).   - Explained OPK's.   - She was advised regarding the avoidance of alcohol and caffeine during early pregnancy, plus the negative effects of smoking on fecundity and on the development of an early pregnancy.    - She was advised to review her immunization history and to update as necessary.    - She was advised to review her family history for potential inherited diseases that would require  screening.  - Explained that if she is unable to get pregnant after 1 year of intercourse to contact clinic and we can continue with a work-up.      FOLLOW-UP with me annually.   Estella Eckert PA-C

## 2024-09-05 ENCOUNTER — OFFICE VISIT (OUTPATIENT)
Dept: OBSTETRICS AND GYNECOLOGY | Facility: CLINIC | Age: 34
End: 2024-09-05
Payer: MEDICARE

## 2024-09-05 VITALS
SYSTOLIC BLOOD PRESSURE: 100 MMHG | WEIGHT: 138.88 LBS | BODY MASS INDEX: 23.84 KG/M2 | DIASTOLIC BLOOD PRESSURE: 62 MMHG

## 2024-09-05 DIAGNOSIS — Z01.419 WOMEN'S ANNUAL ROUTINE GYNECOLOGICAL EXAMINATION: Primary | ICD-10-CM

## 2024-09-05 DIAGNOSIS — N83.209 CYST OF OVARY, UNSPECIFIED LATERALITY: ICD-10-CM

## 2024-09-05 DIAGNOSIS — Z11.51 SCREENING FOR HUMAN PAPILLOMAVIRUS (HPV): ICD-10-CM

## 2024-09-05 DIAGNOSIS — D25.9 UTERINE LEIOMYOMA, UNSPECIFIED LOCATION: ICD-10-CM

## 2024-09-05 DIAGNOSIS — Z12.39 SCREENING BREAST EXAMINATION: ICD-10-CM

## 2024-09-05 DIAGNOSIS — Z12.4 ENCOUNTER FOR PAPANICOLAOU SMEAR FOR CERVICAL CANCER SCREENING: ICD-10-CM

## 2024-09-05 DIAGNOSIS — Z86.19 HISTORY OF SEXUALLY TRANSMITTED DISEASE: ICD-10-CM

## 2024-09-05 DIAGNOSIS — Z11.3 ENCOUNTER FOR SCREENING EXAMINATION FOR SEXUALLY TRANSMITTED DISEASE: ICD-10-CM

## 2024-09-05 PROCEDURE — 99999 PR PBB SHADOW E&M-EST. PATIENT-LVL II: CPT | Mod: PBBFAC,,, | Performed by: PHYSICIAN ASSISTANT

## 2024-09-05 NOTE — PATIENT INSTRUCTIONS
"- Start a prenatal vitamin and make sure you are not currently on any teratogenic medications.  can review these medications at your visit with you.   - 600mg CoQ10 supplement recommended -- has been shown in studies to reduce oocyte aneuploidy. Foods high in Coq10: fatty fish, broccoli, cauliflower, oranges, strawberries, soybean   - 800 mg of EPA (Omega 3- Fatty Acids) & 1200 mg of DHA daily recommended. May improve fertility outcome through an antioxidant effect.   - Vitamin D - at elast 2,000 IU per day   - Melatonin protects oocytes from oxidative stress while trying to get pregnant - 3mg/night   - Folic Acid: 800mcg/day - take at least 1 month before conception to help prevent neural tube defects. Higher doses may be needed if you or your partner have a neural tube defect or are taking anti-epileptics.   -  all of the above may be included in your prenatal vitamin, if so, you do not need to add these on to your daily prenatal vitamin.   - Start tracking your cycle. This will help you have a better understanding of when your ovulation days are. You will want to have intercourse on the days surrounding ovulation day (14 days before beginning of next cycle if you have a 28 day cycle). Day 1 of your period is day 1 of your cycle. Cycle day 12-14 is around when you should be ovulating if you have a 28 day cycle. - Purchase ovulatory predictor kits - these will also assist in determining when you are ovulating. https://www.[employer]/Easy-Home-Ovulation-Strips-Pregnancy/dp/L51FUBI2VK   - Eating a well balanced diet and having at least 150 minutes of exercise a week. Optimal weight and health will improve your chances of getting pregnant and will also make pregnancy easier on you.    - Increased intake of foods high in antioxidants: pomegrante, chocolate, berries, espresso, coffee, fruits & vegetables, turmeric, cumin, brenda, oregano, vitamin C rish foods    - A "mediterranean diet" is high in antioxidants (seafood, " fruits, vegetables)   - Limit intake of red meat, saturated fats    - Avoid foods that have advanced glycation end products: overcooked, charred, grilled, BBQ foods - these can induce oxidative stress.    - Obesity (BMI >30) is associated with a state of systemic inflammation and high oxalate a stress which affects oocyte quality and uterine receptivity.  Obesity is linked to a higher rate of many OB complications including miscarriage preeclampsia   gestational diabetes and  delivery.    - Limit caffeine intake to 2 drinks/day.   - Avoid exposure to bisphenol A (BPA) which is found in plastic containers and canned food linings. Avoid microwaving or heating food or beverages in plastic or styrofoam containers. [employer] is a good tool to make sure your products do not contain toxic ingredients to your reproductive health.   - Stress reduction: sustained stress levels increase cortisol hormone production interfering with the normal balance of the hormones that play a role in fertility function.   - Avoidance of alcohol,smoking and caffeine during early pregnancy. Smoking reduces pregnancy rate by 1/2 and causes severe oxidative stress. It is associated with a 16% higher miscarriage rate. Alcohol intake is associated with decreased fertility success rate.   - Review your immunization history & update as necessary.    - Review family history for potential inherited diseases that would require  screening.  - Male factors:     - Over the counter or prescribed testosterone can DECREASE sperm production    - Alcohol and tobacco can affect quality and quantity of sperm    - Reduce exposure to extreme hete 2-3 months before trying to conceive (ex: saunas)   -If you are unable to get pregnant after 6 months of timed intercourse - follow up with an MD  to further evaluate for other problems.

## 2024-09-12 ENCOUNTER — HOSPITAL ENCOUNTER (OUTPATIENT)
Dept: RADIOLOGY | Facility: HOSPITAL | Age: 34
Discharge: HOME | End: 2024-09-12
Attending: PHYSICIAN ASSISTANT
Payer: MEDICARE

## 2024-09-12 DIAGNOSIS — N83.209 CYST OF OVARY, UNSPECIFIED LATERALITY: ICD-10-CM

## 2024-09-12 DIAGNOSIS — N83.202 LEFT OVARIAN CYST: ICD-10-CM

## 2024-09-12 PROCEDURE — 76856 US EXAM PELVIC COMPLETE: CPT | Mod: 26,,, | Performed by: RADIOLOGY

## 2024-09-12 PROCEDURE — 76830 TRANSVAGINAL US NON-OB: CPT | Mod: TC

## 2024-09-12 PROCEDURE — 76830 TRANSVAGINAL US NON-OB: CPT | Mod: 26,,, | Performed by: RADIOLOGY

## 2024-09-12 PROCEDURE — 76856 US EXAM PELVIC COMPLETE: CPT | Mod: TC

## 2024-09-17 ENCOUNTER — TELEPHONE (OUTPATIENT)
Dept: OBSTETRICS AND GYNECOLOGY | Facility: CLINIC | Age: 34
End: 2024-09-17
Payer: MEDICARE

## 2024-09-17 NOTE — TELEPHONE ENCOUNTER
Call and scheduling was a success!       ----- Message from Estella Eckert PA-C sent at 9/17/2024  9:02 AM CDT -----  Schedule virtual visit to discuss US

## 2024-09-17 NOTE — TELEPHONE ENCOUNTER
----- Message from Estella Eckert PA-C sent at 9/17/2024  9:02 AM CDT -----  Schedule virtual visit to discuss US

## 2024-09-20 NOTE — PROGRESS NOTES
The patient location is: home  The chief complaint leading to consultation is: follow up  Visit type: audiovisual  Face to Face time with patient: 15 minutes   25 minutes of total time spent on the encounter, which includes face to face time and non-face to face time preparing to see the patient (eg, review of tests), Obtaining and/or reviewing separately obtained history, Documenting clinical information in the electronic or other health record, Independently interpreting results (not separately reported) and communicating results to the patient/family/caregiver, or Care coordination (not separately reported).   Each patient to whom he or she provides medical services by telemedicine is:  (1) informed of the relationship between the physician and patient and the respective role of any other health care provider with respect to management of the patient; and (2) notified that he or she may decline to receive medical services by telemedicine and may withdraw from such care at any time.    Notes:   HISTORY OF PRESENT ILLNESS:    Marita Deneen Cooks is a 34 y.o. female  Patient's last menstrual period was 2024 (approximate). presents today for a virtual visit to discuss ultrasound. US shows 2.8cm solid appearing ovarian cyst and 3.5cm fibroid.     Results for orders placed during the hospital encounter of 24  US Pelvis Comp with Transvag NON-OB (xpd  Narrative  EXAMINATION:US PELVIS COMP WITH TRANSVAG NON-OB (XPD)  CLINICAL HISTORY:left ovarian cyst seen on recent CT scan;  Unspecified ovarian cyst, left side  FINDINGS:  The uterus measures 7.3 x 4 0 x 4.9 cm the endometrium 4 mm.  Uterine echotexture is coarse.  Right ovary measures 3.1 x 2.9 x 1.9 cm.  Left ovary measures 1.9 x 1.6 x 2.9 cm.  There is a solid-appearing lesion adjacent to the left ovary measuring 2.8 cm.  There is normal blood flow to both ovaries.  There is a uterine leiomyoma measuring 3.5 cm.  Impression  Solid appearing left  ovarian lesion measuring 2.8 cm.  Follow-up ultrasound is recommended in 3 months.  This is most likely benign.  Uterine leiomyoma.      Past Medical History:   Diagnosis Date    Murmur, cardiac        Past Surgical History:   Procedure Laterality Date    CARDIAC SURGERY         MEDICATIONS AND ALLERGIES:      Current Outpatient Medications:     albuterol (PROVENTIL/VENTOLIN HFA) 90 mcg/actuation inhaler, Inhale 2 puffs into the lungs every 6 (six) hours as needed for Wheezing or Shortness of Breath. Rescue, Disp: 1 Inhaler, Rfl: 0    amitriptyline (ELAVIL) 50 MG tablet, Take 50 mg by mouth every evening., Disp: , Rfl:     erythromycin (ROMYCIN) ophthalmic ointment, Place a 1/2 inch ribbon of ointment into the lower eyelid 3 to 4 times a day for 5 days., Disp: 3.5 g, Rfl: 0    furosemide (LASIX) 20 MG tablet, Take 20 mg by mouth., Disp: , Rfl:     gabapentin (NEURONTIN) 300 MG capsule, Take by mouth., Disp: , Rfl:     ibuprofen (ADVIL,MOTRIN) 800 MG tablet, Take 1 tablet (800 mg total) by mouth every 6 (six) hours as needed., Disp: 12 tablet, Rfl: 0    loratadine (CLARITIN) 10 mg tablet, Take 1 tablet (10 mg total) by mouth once daily., Disp: 30 tablet, Rfl: 0    metoprolol tartrate (LOPRESSOR) 50 MG tablet, , Disp: , Rfl: 3    naltrexone (DEPADE) 50 mg tablet, Take 25 mg by mouth., Disp: , Rfl:     naproxen (NAPROSYN) 500 MG tablet, Take 1 tablet (500 mg total) by mouth 2 (two) times daily., Disp: 20 tablet, Rfl: 0    norethindrone-ethinyl estradiol (JUNEL FE 1/20) 1 mg-20 mcg (21)/75 mg (7) per tablet, Take 1 tablet by mouth once daily., Disp: 30 tablet, Rfl: 11    ondansetron (ZOFRAN-ODT) 4 MG TbDL, Take 1 tablet (4 mg total) by mouth every 6 (six) hours as needed (Nausea)., Disp: 30 tablet, Rfl: 0    promethazine (PHENERGAN) 12.5 MG Tab, Take 1 tablet (12.5 mg total) by mouth 4 (four) times daily., Disp: 20 tablet, Rfl: 0    Review of patient's allergies indicates:   Allergen Reactions    Articaine-epinephrine  "bitart Hives and Swelling     Pt states face "swole up" with this local     Benzocaine Hives and Swelling     Pt states her face "swole up" with this local    Iodine and iodide containing products      ROS:  GENERAL: No fever or chills.  BREASTS: No pain. No lumps. No discharge.  ABDOMEN: No pain. No nausea. No vomiting. No diarrhea. No constipation.  REPRODUCTIVE: No abnormal bleeding.   VULVA: No pain. No lesions. No itching.  VAGINA: No relaxation. No itching. No odor. No discharge. No lesions.  URINARY: No incontinence. No nocturia. No frequency. No dysuria.    Physical Exam  Constitutional:       Appearance: Normal appearance.   HENT:      Head: Normocephalic and atraumatic.      Nose: Nose normal.   Eyes:      Extraocular Movements: Extraocular movements intact.      Pupils: Pupils are equal, round, and reactive to light.   Pulmonary:      Effort: Pulmonary effort is normal.   Musculoskeletal:      Cervical back: Normal range of motion.   Neurological:      Mental Status: She is alert.   Psychiatric:         Mood and Affect: Mood normal.         Behavior: Behavior normal.         Thought Content: Thought content normal.         Judgment: Judgment normal.         ASSESSMENT:   1. Cyst of left ovary  US Pelvis Comp with Transvag NON-OB (xpd    norethindrone-ethinyl estradiol (JUNEL FE 1/20) 1 mg-20 mcg (21)/75 mg (7) per tablet    CANCER ANTIGEN 125      2. 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    - Birth control for now to help control heavy bleeding.    Discussed to RTC/ER if problems today are not resolved as examination is limited by virtual visit.   FOLLOW-UP with me as needed or for annual.  Estella Eckert PA-C      "

## 2024-09-23 ENCOUNTER — OFFICE VISIT (OUTPATIENT)
Dept: OBSTETRICS AND GYNECOLOGY | Facility: CLINIC | Age: 34
End: 2024-09-23
Payer: MEDICARE

## 2024-09-23 ENCOUNTER — TELEPHONE (OUTPATIENT)
Dept: OBSTETRICS AND GYNECOLOGY | Facility: CLINIC | Age: 34
End: 2024-09-23

## 2024-09-23 DIAGNOSIS — D25.9 UTERINE LEIOMYOMA, UNSPECIFIED LOCATION: ICD-10-CM

## 2024-09-23 DIAGNOSIS — N83.202 CYST OF LEFT OVARY: Primary | ICD-10-CM

## 2024-09-23 DIAGNOSIS — D39.12 NEOPLASM OF UNCERTAIN BEHAVIOR OF LEFT OVARY: ICD-10-CM

## 2024-09-23 PROCEDURE — 99213 OFFICE O/P EST LOW 20 MIN: CPT | Mod: 95,,, | Performed by: PHYSICIAN ASSISTANT

## 2024-09-23 RX ORDER — NORETHINDRONE ACETATE AND ETHINYL ESTRADIOL 1MG-20(21)
1 KIT ORAL DAILY
Qty: 30 TABLET | Refills: 11 | Status: SHIPPED | OUTPATIENT
Start: 2024-09-23 | End: 2025-09-23

## 2024-09-23 NOTE — TELEPHONE ENCOUNTER
Tried contacting Pt in regards to her Labs Estella, order today with her virtual visit.. Estella stated she was unsuccessful with getting order's to go thur w/ Pt insurance and need Pt to hold off until she get issue resolve.. No answer LVM to return my call kg

## 2024-10-24 ENCOUNTER — TELEPHONE (OUTPATIENT)
Dept: OTOLARYNGOLOGY | Facility: CLINIC | Age: 34
End: 2024-10-24
Payer: MEDICARE

## 2024-11-01 ENCOUNTER — OFFICE VISIT (OUTPATIENT)
Dept: OBSTETRICS AND GYNECOLOGY | Facility: CLINIC | Age: 34
End: 2024-11-01
Payer: MEDICARE

## 2024-11-01 VITALS
HEIGHT: 64 IN | SYSTOLIC BLOOD PRESSURE: 122 MMHG | DIASTOLIC BLOOD PRESSURE: 74 MMHG | BODY MASS INDEX: 24.52 KG/M2 | WEIGHT: 143.63 LBS

## 2024-11-01 DIAGNOSIS — Q21.3 TETRALOGY OF FALLOT: ICD-10-CM

## 2024-11-01 DIAGNOSIS — Z32.02 NEGATIVE PREGNANCY TEST: Primary | ICD-10-CM

## 2024-11-01 DIAGNOSIS — D21.9 FIBROIDS: ICD-10-CM

## 2024-11-01 DIAGNOSIS — N94.6 DYSMENORRHEA: ICD-10-CM

## 2024-11-01 LAB
B-HCG UR QL: NEGATIVE
CTP QC/QA: YES

## 2024-11-01 PROCEDURE — 3008F BODY MASS INDEX DOCD: CPT | Mod: CPTII,S$GLB,, | Performed by: OBSTETRICS & GYNECOLOGY

## 2024-11-01 PROCEDURE — 3078F DIAST BP <80 MM HG: CPT | Mod: CPTII,S$GLB,, | Performed by: OBSTETRICS & GYNECOLOGY

## 2024-11-01 PROCEDURE — 3074F SYST BP LT 130 MM HG: CPT | Mod: CPTII,S$GLB,, | Performed by: OBSTETRICS & GYNECOLOGY

## 2024-11-01 PROCEDURE — 99999 PR PBB SHADOW E&M-EST. PATIENT-LVL IV: CPT | Mod: PBBFAC,,, | Performed by: OBSTETRICS & GYNECOLOGY

## 2024-11-01 PROCEDURE — 81025 URINE PREGNANCY TEST: CPT | Mod: S$GLB,,, | Performed by: OBSTETRICS & GYNECOLOGY

## 2024-11-01 PROCEDURE — 99214 OFFICE O/P EST MOD 30 MIN: CPT | Mod: S$GLB,,, | Performed by: OBSTETRICS & GYNECOLOGY

## 2024-11-01 PROCEDURE — 1159F MED LIST DOCD IN RCRD: CPT | Mod: CPTII,S$GLB,, | Performed by: OBSTETRICS & GYNECOLOGY

## 2024-11-01 RX ORDER — TRAMADOL HYDROCHLORIDE 50 MG/1
50 TABLET ORAL EVERY 6 HOURS PRN
Qty: 28 TABLET | Refills: 0 | Status: SHIPPED | OUTPATIENT
Start: 2024-11-01

## 2024-11-02 PROBLEM — N94.6 DYSMENORRHEA: Status: ACTIVE | Noted: 2024-11-02

## 2024-11-02 PROBLEM — D21.9 FIBROIDS: Status: ACTIVE | Noted: 2024-11-02

## 2024-11-02 PROBLEM — Q21.3 TETRALOGY OF FALLOT: Status: ACTIVE | Noted: 2024-11-02

## 2024-12-13 ENCOUNTER — TELEPHONE (OUTPATIENT)
Dept: OBSTETRICS AND GYNECOLOGY | Facility: CLINIC | Age: 34
End: 2024-12-13
Payer: MEDICARE

## 2024-12-13 NOTE — TELEPHONE ENCOUNTER
Pt was called. Lvm provider would like to know if the pt will like to schedule her surgery one week earlier. So instead of 01/08 we would like to try 01/03.

## 2024-12-14 DIAGNOSIS — N94.6 DYSMENORRHEA: ICD-10-CM

## 2024-12-14 DIAGNOSIS — D21.9 FIBROIDS: ICD-10-CM

## 2024-12-16 ENCOUNTER — TELEPHONE (OUTPATIENT)
Dept: OBSTETRICS AND GYNECOLOGY | Facility: CLINIC | Age: 34
End: 2024-12-16
Payer: MEDICARE

## 2024-12-16 RX ORDER — TRAMADOL HYDROCHLORIDE 50 MG/1
50 TABLET ORAL EVERY 6 HOURS PRN
Qty: 28 TABLET | Refills: 0 | Status: SHIPPED | OUTPATIENT
Start: 2024-12-16

## 2024-12-16 NOTE — TELEPHONE ENCOUNTER
Refill Routing Note   Medication(s) are not appropriate for processing by [facility] for the following reason(s):        Outside of protocol    [facility] action(s):  Route               Appointments  past 12m or future 3m with PCP    Date Provider   Last Visit   11/1/2024 Manny Marcus MD   Next Visit   1/6/2025 Manny Marcus MD   ED visits in past 90 days: 0        Note composed:6:53 AM 12/16/2024

## 2025-01-06 ENCOUNTER — HOSPITAL ENCOUNTER (OUTPATIENT)
Dept: PREADMISSION TESTING | Facility: HOSPITAL | Age: 35
Discharge: HOME | End: 2025-01-06
Attending: OBSTETRICS & GYNECOLOGY
Payer: MEDICARE

## 2025-01-11 ENCOUNTER — HOSPITAL ENCOUNTER (EMERGENCY)
Facility: HOSPITAL | Age: 35
Discharge: HOME | End: 2025-01-11
Attending: EMERGENCY MEDICINE
Payer: MEDICARE

## 2025-01-11 VITALS
HEIGHT: 64 IN | HEART RATE: 69 BPM | SYSTOLIC BLOOD PRESSURE: 112 MMHG | RESPIRATION RATE: 16 BRPM | BODY MASS INDEX: 22.88 KG/M2 | DIASTOLIC BLOOD PRESSURE: 62 MMHG | TEMPERATURE: 98 F | WEIGHT: 134 LBS | OXYGEN SATURATION: 99 %

## 2025-01-11 DIAGNOSIS — N39.0 URINARY TRACT INFECTION WITHOUT HEMATURIA, SITE UNSPECIFIED: Primary | ICD-10-CM

## 2025-01-11 DIAGNOSIS — D25.9 UTERINE LEIOMYOMA, UNSPECIFIED LOCATION: ICD-10-CM

## 2025-01-11 DIAGNOSIS — N70.11 HYDROSALPINX: ICD-10-CM

## 2025-01-11 LAB
ALBUMIN SERPL BCP-MCNC: 3.6 G/DL (ref 3.5–5.2)
ALP SERPL-CCNC: 60 U/L (ref 40–150)
ALT SERPL W/O P-5'-P-CCNC: 9 U/L (ref 10–44)
ANION GAP SERPL CALC-SCNC: 7 MMOL/L (ref 8–16)
AST SERPL-CCNC: 12 U/L (ref 10–40)
B-HCG UR QL: NEGATIVE
BACTERIA #/AREA URNS HPF: ABNORMAL /HPF
BASOPHILS # BLD AUTO: 0.02 K/UL (ref 0–0.2)
BASOPHILS NFR BLD: 0.3 % (ref 0–1.9)
BILIRUB SERPL-MCNC: 0.7 MG/DL (ref 0.1–1)
BILIRUB UR QL STRIP: NEGATIVE
BUN SERPL-MCNC: 6 MG/DL (ref 6–20)
CALCIUM SERPL-MCNC: 9.6 MG/DL (ref 8.7–10.5)
CHLORIDE SERPL-SCNC: 98 MMOL/L (ref 95–110)
CK SERPL-CCNC: 132 U/L (ref 20–180)
CLARITY UR: CLEAR
CO2 SERPL-SCNC: 29 MMOL/L (ref 23–29)
COLOR UR: YELLOW
CREAT SERPL-MCNC: 1 MG/DL (ref 0.5–1.4)
CTP QC/QA: YES
DIFFERENTIAL METHOD BLD: ABNORMAL
EOSINOPHIL # BLD AUTO: 0.1 K/UL (ref 0–0.5)
EOSINOPHIL NFR BLD: 0.7 % (ref 0–8)
ERYTHROCYTE [DISTWIDTH] IN BLOOD BY AUTOMATED COUNT: 12.1 % (ref 11.5–14.5)
EST. GFR  (NO RACE VARIABLE): >60 ML/MIN/1.73 M^2
GLUCOSE SERPL-MCNC: 82 MG/DL (ref 70–110)
GLUCOSE UR QL STRIP: NEGATIVE
HCT VFR BLD AUTO: 33 % (ref 37–48.5)
HGB BLD-MCNC: 10.8 G/DL (ref 12–16)
HGB UR QL STRIP: NEGATIVE
IMM GRANULOCYTES # BLD AUTO: 0.02 K/UL (ref 0–0.04)
IMM GRANULOCYTES NFR BLD AUTO: 0.3 % (ref 0–0.5)
KETONES UR QL STRIP: NEGATIVE
LEUKOCYTE ESTERASE UR QL STRIP: ABNORMAL
LYMPHOCYTES # BLD AUTO: 0.9 K/UL (ref 1–4.8)
LYMPHOCYTES NFR BLD: 12.8 % (ref 18–48)
MAGNESIUM SERPL-MCNC: 2.1 MG/DL (ref 1.6–2.6)
MCH RBC QN AUTO: 28.3 PG (ref 27–31)
MCHC RBC AUTO-ENTMCNC: 32.7 G/DL (ref 32–36)
MCV RBC AUTO: 86 FL (ref 82–98)
MICROSCOPIC COMMENT: ABNORMAL
MONOCYTES # BLD AUTO: 1 K/UL (ref 0.3–1)
MONOCYTES NFR BLD: 13.2 % (ref 4–15)
NEUTROPHILS # BLD AUTO: 5.2 K/UL (ref 1.8–7.7)
NEUTROPHILS NFR BLD: 72.7 % (ref 38–73)
NITRITE UR QL STRIP: NEGATIVE
NRBC BLD-RTO: 0 /100 WBC
PH UR STRIP: 7 [PH] (ref 5–8)
PLATELET # BLD AUTO: 235 K/UL (ref 150–450)
PMV BLD AUTO: 9.3 FL (ref 9.2–12.9)
POTASSIUM SERPL-SCNC: 3.6 MMOL/L (ref 3.5–5.1)
PROT SERPL-MCNC: 7.7 G/DL (ref 6–8.4)
PROT UR QL STRIP: NEGATIVE
RBC # BLD AUTO: 3.82 M/UL (ref 4–5.4)
RBC #/AREA URNS HPF: 2 /HPF (ref 0–4)
SODIUM SERPL-SCNC: 134 MMOL/L (ref 136–145)
SP GR UR STRIP: 1.01 (ref 1–1.03)
SQUAMOUS #/AREA URNS HPF: 5 /HPF
URN SPEC COLLECT METH UR: ABNORMAL
UROBILINOGEN UR STRIP-ACNC: NEGATIVE EU/DL
WBC # BLD AUTO: 7.17 K/UL (ref 3.9–12.7)
WBC #/AREA URNS HPF: 43 /HPF (ref 0–5)

## 2025-01-11 PROCEDURE — 82550 ASSAY OF CK (CPK): CPT | Performed by: PHYSICIAN ASSISTANT

## 2025-01-11 PROCEDURE — 83735 ASSAY OF MAGNESIUM: CPT | Performed by: PHYSICIAN ASSISTANT

## 2025-01-11 PROCEDURE — 96374 THER/PROPH/DIAG INJ IV PUSH: CPT

## 2025-01-11 PROCEDURE — 87088 URINE BACTERIA CULTURE: CPT | Performed by: PHYSICIAN ASSISTANT

## 2025-01-11 PROCEDURE — 96375 TX/PRO/DX INJ NEW DRUG ADDON: CPT

## 2025-01-11 PROCEDURE — 81025 URINE PREGNANCY TEST: CPT | Performed by: PHYSICIAN ASSISTANT

## 2025-01-11 PROCEDURE — 99285 EMERGENCY DEPT VISIT HI MDM: CPT | Mod: 25

## 2025-01-11 PROCEDURE — 87086 URINE CULTURE/COLONY COUNT: CPT | Performed by: PHYSICIAN ASSISTANT

## 2025-01-11 PROCEDURE — 85025 COMPLETE CBC W/AUTO DIFF WBC: CPT | Performed by: PHYSICIAN ASSISTANT

## 2025-01-11 PROCEDURE — 87591 N.GONORRHOEAE DNA AMP PROB: CPT | Performed by: PHYSICIAN ASSISTANT

## 2025-01-11 PROCEDURE — 87186 SC STD MICRODIL/AGAR DIL: CPT | Performed by: PHYSICIAN ASSISTANT

## 2025-01-11 PROCEDURE — 80053 COMPREHEN METABOLIC PANEL: CPT | Performed by: PHYSICIAN ASSISTANT

## 2025-01-11 PROCEDURE — 63600175 PHARM REV CODE 636 W HCPCS: Mod: JZ,TB

## 2025-01-11 PROCEDURE — 81000 URINALYSIS NONAUTO W/SCOPE: CPT | Performed by: PHYSICIAN ASSISTANT

## 2025-01-11 RX ORDER — APIXABAN 5 MG/1
5 TABLET, FILM COATED ORAL 2 TIMES DAILY
COMMUNITY
Start: 2024-11-30

## 2025-01-11 RX ORDER — HYDROMORPHONE HYDROCHLORIDE 1 MG/ML
1 INJECTION, SOLUTION INTRAMUSCULAR; INTRAVENOUS; SUBCUTANEOUS
Status: COMPLETED | OUTPATIENT
Start: 2025-01-11 | End: 2025-01-11

## 2025-01-11 RX ORDER — METRONIDAZOLE 500 MG/1
500 TABLET ORAL 2 TIMES DAILY
Qty: 28 TABLET | Refills: 0 | Status: SHIPPED | OUTPATIENT
Start: 2025-01-11 | End: 2025-01-25

## 2025-01-11 RX ORDER — DOXYCYCLINE 100 MG/1
100 CAPSULE ORAL 2 TIMES DAILY
Qty: 28 CAPSULE | Refills: 0 | Status: SHIPPED | OUTPATIENT
Start: 2025-01-11 | End: 2025-01-25

## 2025-01-11 RX ORDER — ACETAMINOPHEN 500 MG
500 TABLET ORAL EVERY 6 HOURS PRN
Qty: 30 TABLET | Refills: 0 | Status: SHIPPED | OUTPATIENT
Start: 2025-01-11

## 2025-01-11 RX ORDER — KETOROLAC TROMETHAMINE 30 MG/ML
15 INJECTION, SOLUTION INTRAMUSCULAR; INTRAVENOUS
Status: COMPLETED | OUTPATIENT
Start: 2025-01-11 | End: 2025-01-11

## 2025-01-11 RX ORDER — CEFTRIAXONE 2 G/1
2 INJECTION, POWDER, FOR SOLUTION INTRAMUSCULAR; INTRAVENOUS
Status: COMPLETED | OUTPATIENT
Start: 2025-01-11 | End: 2025-01-11

## 2025-01-11 RX ORDER — NAPROXEN 500 MG/1
500 TABLET ORAL 2 TIMES DAILY
Qty: 30 TABLET | Refills: 0 | Status: SHIPPED | OUTPATIENT
Start: 2025-01-11

## 2025-01-11 RX ADMIN — KETOROLAC TROMETHAMINE 15 MG: 30 INJECTION, SOLUTION INTRAMUSCULAR; INTRAVENOUS at 08:01

## 2025-01-11 RX ADMIN — HYDROMORPHONE HYDROCHLORIDE 1 MG: 1 INJECTION, SOLUTION INTRAMUSCULAR; INTRAVENOUS; SUBCUTANEOUS at 09:01

## 2025-01-11 RX ADMIN — CEFTRIAXONE 2 G: 2 INJECTION, POWDER, FOR SOLUTION INTRAMUSCULAR; INTRAVENOUS at 09:01

## 2025-01-12 LAB
C TRACH DNA SPEC QL NAA+PROBE: NOT DETECTED
N GONORRHOEA DNA SPEC QL NAA+PROBE: NOT DETECTED

## 2025-01-12 NOTE — ED TRIAGE NOTES
Pt reports she had to cancel a surgery for uterine fibroids 2 days ago for personal reasons.  She has been unable to tolerate pain to LLQ.  Has not rescheduled surgery.  Hx: hx of TOF seen by Dr. Mann at [facility].

## 2025-01-12 NOTE — DISCHARGE INSTRUCTIONS
You have been discharged with antibiotics today. As discussed during the visit, here are some common side effects of the medication you have been prescribed, along with other antibiotics you may encounter for your education.  The most common antibiotic side effects include upset stomach, diarrhea, nausea, vomiting. With most antibiotics it is recommended to take them with a small meal or water. When taking antibiotics it is also important to completely avoid consuming any alcohol as this may cause adverse reactions or worsening side effects.  If at any point you develop shortness of breath, tightness in the throat, swelling of tongue, difficulty swallowing, hoarse voice, noisy breathing, confusion, please stop taking the antibiotics and go to the nearest emergency department immediately as this could indicate an allergic reaction called anaphylaxis. Also stop taking your antibiotics and return to the emergency department if you develop blistering rash/peeling skin, flu-like symptoms, or blisters/sores as this may be a dangerous reaction known as Glass-Malvin Syndrome (especially with penicillin based antibiotics, cephalosporin antibiotics, Quinolones, Macrolides, or sulfa containing antibiotics like bactrim).    Common antibiotics and their side effects:   - Penicillins (Amoxicillin, Augmentin): upset stomach, nausea, vomiting.  - Cephalosporins (Cephalexin/Keflex, Cefuroxime, Cefoxitin, Ceftriaxone, Cefepime, etc.): upset stomach, diarrhea, nausea, vomiting.  - Tetracyclines (Doxycycline): upset stomach if not taken with enough water, photosensitive rash. While taking this antibiotic, please be cautious when exposed to sunlight and protect yourself by frequency applying and reapplying sunscreen as you may become more sensitive to sunlight leading to moderate-severe sunburns.  - Quinolones (Ciprofloxacin, Levofloxacin, etc): upset stomach, dizziness, insomnia, weakness, inflammation of tendons.  -  Metronidazole/Flagyl: upset stomach, metallic taste, rash. When taking this medication you need to abstain from alcohol consumption for the entirety of the course of treatment plus 3 days after completing your course of this medication. (Example: if prescribed 7 days of this medication, do not consume alcohol for 10 days).  - Clindamycin: nausea, vomiting, rash, joint pain, heartburn.   - Macrolides (Azithromycin, Erythromycin, Clarithromycin): upset stomach.  - Trimethoprim sulfamethoxazole/Bactrim: upset stomach, nausea, vomiting, increased risk of sunburn (wear sunscreen if exposed to the sun), feeling tired.    (note: this is note a complete list of side effects, and includes some, but not all common side effects)      Thank you for coming to our Emergency Department today. It is important to remember that some problems or medical conditions are difficult to diagnose and may not be found or addressed during your Emergency Department visit.  These conditions often start with non-specific symptoms and can only be diagnosed on follow up visits with your primary care physician or specialist when the symptoms continue or change. Please remember that all medical conditions can change, and we cannot predict how you will be feeling tomorrow or the next day. Return to the ER with any questions/concerns, new/concerning symptoms including fever, chest pain, shortness of breath, loss of consciousness, dizziness, weakness, worsening symptoms, failure to improve, or any other concerns. Also, please follow up with your Primary Care Physician and/or Pediatrician in the next 1-2 days to review your ED visit in entirety and for re-evaluation.   Be sure to follow up with your primary care doctor and review all labs/imaging/tests that were performed during your ER visit with them. It is very common for us to identify non-emergent incidental findings which must be followed up with your primary care physician.  Some  labs/imaging/tests may be outside of the normal range, and require non-emergent follow-up and/or further investigation/treatment/procedures/testing to help diagnose/exclude/prevent complications or other potentially serious medical conditions. Some abnormalities may not have been discussed or addressed during your ER visit. Some lab results may not return during your ER visit but can be accessible by downloading the free [facility] jose or by visiting [email or web address] . It is important for you to review all labs/imaging/tests which are outside of the normal range with your physician.  An ER visit does not replace a primary care visit, and many screening tests or follow-up tests cannot be ordered by an ER doctor or performed by the ER. Some tests may even require pre-approval.  If you do not have a primary care doctor, you may contact the one listed on your discharge paperwork or you may also call the [facility] Appointment Desk at [phone] , or [facility] at  [phone] to schedule an appointment, or establish care with a primary care doctor or even a specialist and to obtain information about local resources. It is important to your health that you have a primary care doctor.  Please take all medications as directed. We have done our best to select a medication for you that will treat your condition however, all medications may potentially have side-effects and it is impossible to predict which medications may give you side-effects or what those side-effects (if any) those medications may give you.  If you feel that you are having a negative effect or side-effect of any medication you should stop taking those medications immediately and seek medical attention. If you feel that you are having a life-threatening reaction call 911.  Do not drive, swim, climb to height, take a bath, operate heavy machinery, drink alcohol or take potentially sedating medications, sign any legal documents or make  any important decisions for 24 hours if you have received any pain medications, sedatives or mood altering drugs during your ER visit or within 24 hours of taking them if they have been prescribed to you.   You can find additional resources for Dentists, hearing aids, durable medical equipment, low cost pharmacies and other resources at [email or web address]  Patient agrees with this plan. Discussed with her strict return precautions, they verbalized understanding. Patient is stable for discharge.   § Please take all medication as prescribed.

## 2025-01-12 NOTE — ED PROVIDER NOTES
"Encounter Date: 1/11/2025    SCRIBE #1 NOTE: I, Sarah Zuniga, am scribing for, and in the presence of,  Rah Samson PA-C. I have scribed the following portions of the note - Other sections scribed: HPI/ROS.       History     Chief Complaint   Patient presents with    Abdominal Pain     Pt reports pain to LLQ x months. Pt reports Hx of fibroids and ovarian cysts, stating she was scheduled for surgery 01/08 but states she cancelled bc she "had a lot going on." Pt also reports pain to left anterior thigh pain x2 weeks, denies trauma/injury.      Patient is a 34 y.o. female, with a PMHx of HTN, TOF with repair, ovarian cyst, who presents to the ED with constant left lower quadrant abdominal pain. Pt had ovarian cyst surgery scheduled for 1/8/25, but cancelled it. Pt had tramadol 3 hours ago. Also reports urinary frequency over the last several days. No other exacerbating or alleviating factors. Denies fever, CP, SOB, N/V/D, vaginal discharge, hematuria, flank pain, dysuria, vaginal bleeding, or other associated symptoms.       The history is provided by the patient and medical records.     Review of patient's allergies indicates:   Allergen Reactions    Articaine-epinephrine bitart Hives and Swelling     Pt states face "swole up" with this local     Benzocaine Hives and Swelling     Pt states her face "swole up" with this local    Iodine and iodide containing products      Past Medical History:   Diagnosis Date    Hypertension     Murmur, cardiac     Tetralogy of Fallot      Past Surgical History:   Procedure Laterality Date    CARDIAC SURGERY      multiple - repair of Tetralogy of Fallot     Family History   Problem Relation Name Age of Onset    No Known Problems Father       Social History     Tobacco Use    Smoking status: Former     Current packs/day: 0.00     Average packs/day: 0.5 packs/day for 15.8 years (7.9 ttl pk-yrs)     Types: Cigarettes     Start date: 1/1/2003     Quit date: 10/28/2018     Years " since quittin.2    Smokeless tobacco: Never   Substance Use Topics    Alcohol use: Yes    Drug use: No     Review of Systems   Constitutional:  Negative for chills, diaphoresis, fatigue and fever.   HENT:  Negative for congestion, rhinorrhea and sore throat.    Eyes:  Negative for redness and visual disturbance.   Respiratory:  Negative for cough and shortness of breath.    Cardiovascular:  Negative for chest pain, palpitations and leg swelling.   Gastrointestinal:  Positive for abdominal pain (left lower quadrant). Negative for diarrhea, nausea and vomiting.   Genitourinary:  Positive for frequency. Negative for decreased urine volume, difficulty urinating, dysuria, flank pain, genital sores, hematuria, vaginal bleeding and vaginal discharge.   Musculoskeletal:  Negative for arthralgias, back pain, myalgias, neck pain and neck stiffness.   Skin:  Negative for rash.   Neurological:  Negative for dizziness, weakness, light-headedness and headaches.   Hematological:  Does not bruise/bleed easily.       Physical Exam     Initial Vitals [25 1902]   BP Pulse Resp Temp SpO2   126/64 (!) 112 16 99.1 °F (37.3 °C) 99 %      MAP       --         Physical Exam    Nursing note and vitals reviewed.  Constitutional: She appears well-developed and well-nourished. She is not diaphoretic. No distress.   HENT:   Head: Normocephalic and atraumatic.   Right Ear: External ear normal.   Left Ear: External ear normal.   Nose: Nose normal. Mouth/Throat: Oropharynx is clear and moist.   Eyes: Conjunctivae and EOM are normal. Pupils are equal, round, and reactive to light. Right eye exhibits no discharge. Left eye exhibits no discharge.   Neck: Neck supple.   Normal range of motion.   Full passive range of motion without pain.     Cardiovascular:  Normal rate, regular rhythm, normal heart sounds and normal pulses.     Exam reveals no distant heart sounds and no friction rub.       Pulmonary/Chest: Effort normal and breath sounds  normal. No respiratory distress.   Abdominal: Abdomen is soft. Bowel sounds are normal. She exhibits no distension, no pulsatile midline mass and no mass. There is no splenomegaly or hepatomegaly. There is abdominal tenderness in the suprapubic area and left lower quadrant.   No right CVA tenderness.  No left CVA tenderness. There is no rebound, no guarding, no tenderness at McBurney's point and negative Brady's sign.   Musculoskeletal:         General: Normal range of motion.      Right shoulder: Normal.      Left shoulder: Normal.      Right elbow: Normal.      Left elbow: Normal.      Right wrist: Normal.      Left wrist: Normal.      Right hand: Normal.      Left hand: Normal.      Cervical back: Normal, full passive range of motion without pain, normal range of motion and neck supple.      Thoracic back: Normal.      Lumbar back: Normal.      Right hip: Normal.      Left hip: Normal.      Right knee: Normal.      Left knee: Normal.      Right lower leg: Normal.      Left lower leg: Normal.      Right ankle: Normal.      Left ankle: Normal.      Right foot: Normal.      Left foot: Normal.     Neurological: She is alert and oriented to person, place, and time. She has normal strength. No cranial nerve deficit or sensory deficit. Gait normal.   Skin: Skin is warm and dry. Capillary refill takes less than 2 seconds. No bruising, no ecchymosis and no rash noted. No pallor.   Psychiatric: She has a normal mood and affect. Her speech is normal and behavior is normal. Thought content normal.         ED Course   Procedures  Labs Reviewed   URINALYSIS, REFLEX TO URINE CULTURE - Abnormal       Result Value    Specimen UA Urine, Clean Catch      Color, UA Yellow      Appearance, UA Clear      pH, UA 7.0      Specific Gravity, UA 1.015      Protein, UA Negative      Glucose, UA Negative      Ketones, UA Negative      Bilirubin (UA) Negative      Occult Blood UA Negative      Nitrite, UA Negative      Urobilinogen, UA  Negative      Leukocytes, UA 2+ (*)     Narrative:     Specimen Source->Urine   CBC W/ AUTO DIFFERENTIAL - Abnormal    WBC 7.17      RBC 3.82 (*)     Hemoglobin 10.8 (*)     Hematocrit 33.0 (*)     MCV 86      MCH 28.3      MCHC 32.7      RDW 12.1      Platelets 235      MPV 9.3      Immature Granulocytes 0.3      Gran # (ANC) 5.2      Immature Grans (Abs) 0.02      Lymph # 0.9 (*)     Mono # 1.0      Eos # 0.1      Baso # 0.02      nRBC 0      Gran % 72.7      Lymph % 12.8 (*)     Mono % 13.2      Eosinophil % 0.7      Basophil % 0.3      Differential Method Automated     COMPREHENSIVE METABOLIC PANEL - Abnormal    Sodium 134 (*)     Potassium 3.6      Chloride 98      CO2 29      Glucose 82      BUN 6      Creatinine 1.0      Calcium 9.6      Total Protein 7.7      Albumin 3.6      Total Bilirubin 0.7      Alkaline Phosphatase 60      AST 12      ALT 9 (*)     eGFR >60      Anion Gap 7 (*)    URINALYSIS MICROSCOPIC - Abnormal    RBC, UA 2      WBC, UA 43 (*)     Bacteria Occasional      Squam Epithel, UA 5      Microscopic Comment SEE COMMENT      Narrative:     Specimen Source->Urine   CULTURE, URINE   CK         MAGNESIUM    Magnesium 2.1     C. TRACHOMATIS/N. GONORRHOEAE BY AMP DNA   POCT URINE PREGNANCY    POC Preg Test, Ur Negative       Acceptable Yes            Imaging Results              US Pelvis Comp with Transvag NON-OB (xpd) (Final result)  Result time 01/11/25 22:23:54   Procedure changed from US Pelvis Complete Non OB     Final result by Alfredito Mosley MD (01/11/25 22:23:54)                   Impression:      1. Complex extra-ovarian/adnexal regions potentially representing hydrosalpinx or pyosalpinx.  These are difficult to fully distinguish and measure on images and cine loops provided.  Clinical correlation is needed, and future follow-up is recommended.  2. No evidence of ovarian torsion, as clinically questioned.  3. Uterine fibroid.      Electronically signed  by: Alfredito Mosley MD  Date:    01/11/2025  Time:    22:23               Narrative:    EXAMINATION:  US PELVIS COMP WITH TRANSVAG NON-OB (XPD)    CLINICAL HISTORY:  pelvic pain;L Ovarian torsion?;    TECHNIQUE:  Transabdominal sonography of the pelvis was performed, followed by transvaginal sonography to better evaluate the uterus and ovaries.    COMPARISON:  Pelvic ultrasound September 2024.    FINDINGS:  The uterus measures 9 x 4 x 5 cm. Uterine parenchyma is heterogenous in echotexture with a 3.5 cm fibroid.  The endometrial echo complex is normal in thickness and measures 6 mm.    The right ovary measures 4 x 3 x 4 cm. The left ovary measures 4 x 3 x 4 cm. Arterial and venous flow are preserved bilaterally.  Complex extra-ovarian/adnexal regions are seen which could represent potential hydrosalpinx or pyosalpinx.  These are difficult to distinguish and accurately measure on images and cine loops provided.  Small volume pelvic free fluid is seen.                                       Medications   ketorolac injection 15 mg (15 mg Intravenous Given 1/11/25 2001)   cefTRIAXone injection 2 g (2 g Intravenous Given 1/11/25 2103)   HYDROmorphone injection 1 mg (1 mg Intravenous Given 1/11/25 2140)     Medical Decision Making  Patient is a 34 y.o. female, with a PMHx of HTN, TOF with repair, ovarian cyst, who presents to the ED with constant left lower quadrant abdominal pain. Pt had ovarian cyst surgery scheduled for 1/8/25, but cancelled it. Pt had tramadol 3 hours ago. Also reports urinary frequency over the last several days. No other exacerbating or alleviating factors. Denies fever, CP, SOB, N/V/D, vaginal discharge, hematuria, flank pain, dysuria, vaginal bleeding, or other associated symptoms.   Patient's chart and medical history reviewed.  Patient's vitals reviewed.  They are afebrile, no respiratory distress, nontoxic-appearing in the ED.  Differential diagnosis is considered UTI, pyelonephritis,  nephrolithiasis, PID, TOA, ectopic pregnancy, ovarian cyst, ovarian torsion, diverticulitis, AAA.   Patient was known ovarian cyst to a scheduled for cyst removal that canceled her scheduled surgery as she thought the pain would go away on its own.  Patient reports continuation of the same pain she has been having that has been slowly worsening over the past several days as well as new symptom of urinary frequency.  Patient denies vaginal bleeding or discharge or STD exposures states she was in a single partnership.  Patient was exam pertinent for left lower quadrant tenderness and suprapubic tenderness to palpation.  Possible torsion however unlikely with duration of symptoms but will still evaluate further with ultrasound.  More per ED course regarding patient's lab work.  Correlating with the patient's urinary frequency and evidence of leukocytes and white blood cells and patient was urinalysis we will treat his suspected developing UTI as well treat with Rocephin in the emergency department.  That has no CVA tenderness is suggest pyelonephritis.  More per ED course. Will treat outpatient for possible PID. Referred to OBGYN and advised re-scheduling appointment with her provider Dr. Marcus. Dc home with doxy and flagyl as well as naproxen and tylenol for pain control.  Strict return precautions for any new or worsening symptoms.  At this time I'll discharge home to follow up with primary care physician in the next 1-2 days for further evaluation.  If the symptom/symptoms continue the pt will need to see OBGYN for further evaluation.  The patient/family is comfortable with this plan and comfortable going home at this time. After taking into careful account the historical factors and physical exam findings of the patient's presentation today, in conjunction with the empirical and objective data obtained on ED workup, no acute emergent medical condition has been identified. The patient appears to be low risk for an  emergent medical condition and I feel it is safe and appropriate at this time for the patient to be discharged to follow-up as detailed in their discharge instructions for reevaluation and possible continued outpatient workup and management. I have discussed the specifics of the workup with the patient/family and they have verbalized understanding of the details of the workup, the diagnosis, the treatment plan, and the need for outpatient follow-up.  Although the patient has no emergent etiology today this does not preclude the development of an emergent condition so in addition, I have advised the patient/family that they can return to the ED and/or activate EMS at any time with worsening of their symptoms, change of their symptoms, or with any other medical complaint.  The patient remained comfortable and stable during their visit in the ED.  Discharge and follow-up instructions discussed with the patient/family who expressed understanding and willingness to comply with my recommendations. I discussed with the patient/family the diagnosis, treatment plan, indications for return to the emergency department, and for expected follow-up. I again reiterated to please follow up with their primary doctor in 1-2 days and return to the ED in any new, worsening, or continued symptoms. The patient/family verbalized an understanding. The patient/family is asked if there are any questions or concerns. We discuss the case, until all issues are addressed to the patient/family's satisfaction. Patient/family understands and is agreeable to the plan.   TAJ GUERRERO PA-C    DISCLAIMER: This note was prepared with [employer] voice recognition transcription software. Garbled syntax, mangled pronouns, and other bizarre constructions may be attributed to that software system.          Amount and/or Complexity of Data Reviewed  Labs: ordered. Decision-making details documented in ED Course.  Radiology: ordered. Decision-making details  documented in ED Course.    Risk  OTC drugs.  Prescription drug management.               ED Course as of 01/11/25 2249   Sat Jan 11, 2025 2001 Leukocyte Esterase, UA(!): 2+ [AF]   2001 WBC, UA(!): 43 [AF]   2011 WBC: 7.17 [AF]   2024 Comprehensive metabolic panel(!)  No significant electrolyte abnormalities.  No LUZ ELENA. [AF]   2109 Patient was endorses improvement of pain.  States pain is now tolerable. [AF]   2146 BP: 122/65 [AF]   2147 Pulse: 97 [AF]   2147 SpO2: 100 % [AF]   2238 US Pelvis Comp with Transvag NON-OB (xpd)  Questioned hydrosalpinx vs pyosalpinx on us. Regarding patient with no consitutional symptoms, duration of symptoms since August, afebrile, with no elevated WBC, more likely hydrosalpinx. Unlikely TOA also with no vaginal discharge or STD concerns. However will cover for PID otherwise on discharge. Patient will follow up with her OBGYN in 1-2 days. [AF]      ED Course User Index  [AF] Rah Samson PA-C I, Alain David Flexer, PA-C, personally performed the services described in this documentation. All medical record entries made by the scribe were at my direction and in my presence. I have reviewed the chart and agree that the record reflects my personal performance and is accurate and complete.      DISCLAIMER: This note was prepared with [employer] voice recognition transcription software. Garbled syntax, mangled pronouns, and other bizarre constructions may be attributed to that software system.        Clinical Impression:  Final diagnoses:  [N39.0] Urinary tract infection without hematuria, site unspecified (Primary)  [D25.9] Uterine leiomyoma, unspecified location  [N70.11] Hydrosalpinx          ED Disposition Condition    Discharge Stable          ED Prescriptions       Medication Sig Dispense Start Date End Date Auth. Provider    doxycycline (VIBRAMYCIN) 100 MG Cap Take 1 capsule (100 mg total) by mouth 2 (two) times daily. for 14 days 28 capsule 1/11/2025 1/25/2025  Rah Samson PA-C    metroNIDAZOLE (FLAGYL) 500 MG tablet Take 1 tablet (500 mg total) by mouth 2 (two) times a day. for 14 days 28 tablet 1/11/2025 1/25/2025 Rah Samson PA-C    naproxen (NAPROSYN) 500 MG tablet Take 1 tablet (500 mg total) by mouth 2 (two) times daily. 30 tablet 1/11/2025 -- Rah Samson PA-C    acetaminophen (TYLENOL) 500 MG tablet Take 1 tablet (500 mg total) by mouth every 6 (six) hours as needed for Pain. 30 tablet 1/11/2025 -- Rah Samson PA-C          Follow-up Information       Follow up With Specialties Details Why Contact Info    [facility] -  Schedule an appointment as soon as possible for a visit in 1 day for follow up if you do not currently have a  [address] [place] [address]  [phone]      [facility] Emergency Dept Emergency Medicine Go to  If symptoms worsen [address] [facility] [place] [address] [phone]    Manny Marcus MD Obstetrics, Obstetrics and Gynecology Schedule an appointment as soon as possible for a visit in 1 day for follow up [address]  [address]  [facility] [address]  [phone]               Rah Samson PA-C  01/11/25 3018

## 2025-01-13 LAB — BACTERIA UR CULT: ABNORMAL

## 2025-01-14 ENCOUNTER — TELEPHONE (OUTPATIENT)
Dept: OBSTETRICS AND GYNECOLOGY | Facility: CLINIC | Age: 35
End: 2025-01-14
Payer: MEDICARE

## 2025-01-14 NOTE — TELEPHONE ENCOUNTER
Pt call was returned. Pt wants to reschedule her sx from 01/08/2025. Pt was informed provider next available date is 03/12/2025.    ----- Message from Yvette sent at 1/14/2025  8:12 AM CST -----  Who called: Self      What is the request in detail: pt is calling to reschedule her  ROBOTIC HYSTERECTOMY       Can the clinic reply by [facility]? No      Would the patient rather a call back or a response via [facility]? Call Back      Best call back number: .[phone]       Additional Information: pt stated that the Emergency room said she needs it done asap

## 2025-01-16 ENCOUNTER — TELEPHONE (OUTPATIENT)
Dept: EMERGENCY MEDICINE | Facility: HOSPITAL | Age: 35
End: 2025-01-16
Payer: MEDICARE

## 2025-01-16 ENCOUNTER — TELEPHONE (OUTPATIENT)
Dept: OBSTETRICS AND GYNECOLOGY | Facility: CLINIC | Age: 35
End: 2025-01-16
Payer: MEDICARE

## 2025-01-16 RX ORDER — CEPHALEXIN 500 MG/1
1000 CAPSULE ORAL EVERY 12 HOURS
Qty: 20 CAPSULE | Refills: 0 | Status: SHIPPED | OUTPATIENT
Start: 2025-01-16 | End: 2025-01-21

## 2025-01-17 ENCOUNTER — TELEPHONE (OUTPATIENT)
Dept: OBSTETRICS AND GYNECOLOGY | Facility: CLINIC | Age: 35
End: 2025-01-17
Payer: MEDICARE

## 2025-01-17 NOTE — TELEPHONE ENCOUNTER
Called and spoke with pt about scheduling apt for cardiac clearance.        ----- Message from Manny Marcus MD sent at 1/16/2025 10:45 PM CST -----  Regarding: surgery for this pt  This pt needs cardiac clearance before she can have any surgery.  ----- Message -----  From: David Dunbar MA  Sent: 1/14/2025   1:57 PM CST  To: Manny Marcus MD    Please schedule sx for 03/12/2025  Schedule pre-op is on 03/10/2025  ----- Message -----  From: Yvette Bynum  Sent: 1/14/2025   8:14 AM CST  To: Ang[facility] ISAACS Staff    Who called: Self      What is the request in detail: pt is calling to reschedule her  ROBOTIC HYSTERECTOMY       Can the clinic reply by [facility]? No      Would the patient rather a call back or a response via [facility]? Call Back      Best call back number: .[phone]       Additional Information: pt stated that the Emergency room said she needs it done asap

## 2025-05-01 ENCOUNTER — HOSPITAL ENCOUNTER (OUTPATIENT)
Facility: HOSPITAL | Age: 35
Discharge: HOME | End: 2025-05-01
Attending: STUDENT IN AN ORGANIZED HEALTH CARE EDUCATION/TRAINING PROGRAM | Admitting: INTERNAL MEDICINE
Payer: MEDICARE

## 2025-05-01 VITALS
HEIGHT: 64 IN | BODY MASS INDEX: 24.75 KG/M2 | SYSTOLIC BLOOD PRESSURE: 127 MMHG | HEART RATE: 75 BPM | WEIGHT: 145 LBS | OXYGEN SATURATION: 99 % | TEMPERATURE: 99 F | RESPIRATION RATE: 16 BRPM | DIASTOLIC BLOOD PRESSURE: 70 MMHG

## 2025-05-01 DIAGNOSIS — Z09 HOSPITAL DISCHARGE FOLLOW-UP: ICD-10-CM

## 2025-05-01 DIAGNOSIS — R07.9 CHEST PAIN: ICD-10-CM

## 2025-05-01 DIAGNOSIS — M94.0 COSTOCHONDRITIS, ACUTE: ICD-10-CM

## 2025-05-01 DIAGNOSIS — I48.91 ATRIAL FIBRILLATION, UNSPECIFIED TYPE: Primary | ICD-10-CM

## 2025-05-01 DIAGNOSIS — Q21.3 TETRALOGY OF FALLOT: ICD-10-CM

## 2025-05-01 LAB
ABSOLUTE EOSINOPHIL (OHS): 0.14 K/UL
ABSOLUTE MONOCYTE (OHS): 0.39 K/UL (ref 0.3–1)
ABSOLUTE NEUTROPHIL COUNT (OHS): 3.17 K/UL (ref 1.8–7.7)
ALBUMIN SERPL BCP-MCNC: 4.2 G/DL (ref 3.5–5.2)
ALP SERPL-CCNC: 55 UNIT/L (ref 40–150)
ALT SERPL W/O P-5'-P-CCNC: 32 UNIT/L (ref 10–44)
ANION GAP (OHS): 11 MMOL/L (ref 8–16)
AST SERPL-CCNC: 36 UNIT/L (ref 11–45)
BASOPHILS # BLD AUTO: 0.04 K/UL
BASOPHILS NFR BLD AUTO: 0.7 %
BILIRUB SERPL-MCNC: 0.7 MG/DL (ref 0.1–1)
BNP SERPL-MCNC: <10 PG/ML (ref 0–99)
BUN SERPL-MCNC: 7 MG/DL (ref 6–20)
CALCIUM SERPL-MCNC: 9.6 MG/DL (ref 8.7–10.5)
CHLORIDE SERPL-SCNC: 103 MMOL/L (ref 95–110)
CO2 SERPL-SCNC: 23 MMOL/L (ref 23–29)
CREAT SERPL-MCNC: 1.1 MG/DL (ref 0.5–1.4)
ERYTHROCYTE [DISTWIDTH] IN BLOOD BY AUTOMATED COUNT: 13.5 % (ref 11.5–14.5)
GFR SERPLBLD CREATININE-BSD FMLA CKD-EPI: >60 ML/MIN/1.73/M2
GLUCOSE SERPL-MCNC: 91 MG/DL (ref 70–110)
HCT VFR BLD AUTO: 41.8 % (ref 37–48.5)
HGB BLD-MCNC: 13.1 GM/DL (ref 12–16)
IMM GRANULOCYTES # BLD AUTO: 0.02 K/UL (ref 0–0.04)
IMM GRANULOCYTES NFR BLD AUTO: 0.4 % (ref 0–0.5)
LYMPHOCYTES # BLD AUTO: 1.78 K/UL (ref 1–4.8)
MCH RBC QN AUTO: 26.6 PG (ref 27–31)
MCHC RBC AUTO-ENTMCNC: 31.3 G/DL (ref 32–36)
MCV RBC AUTO: 85 FL (ref 82–98)
NUCLEATED RBC (/100WBC) (OHS): 0 /100 WBC
OHS QRS DURATION: 142 MS
OHS QRS DURATION: 144 MS
OHS QTC CALCULATION: 468 MS
OHS QTC CALCULATION: 482 MS
PLATELET # BLD AUTO: 269 K/UL (ref 150–450)
PMV BLD AUTO: 10.2 FL (ref 9.2–12.9)
POTASSIUM SERPL-SCNC: 3.8 MMOL/L (ref 3.5–5.1)
PROT SERPL-MCNC: 8.3 GM/DL (ref 6–8.4)
RBC # BLD AUTO: 4.93 M/UL (ref 4–5.4)
RELATIVE EOSINOPHIL (OHS): 2.5 %
RELATIVE LYMPHOCYTE (OHS): 32.1 % (ref 18–48)
RELATIVE MONOCYTE (OHS): 7 % (ref 4–15)
RELATIVE NEUTROPHIL (OHS): 57.3 % (ref 38–73)
SODIUM SERPL-SCNC: 137 MMOL/L (ref 136–145)
TROPONIN I SERPL HS-MCNC: <3 NG/L
TROPONIN I SERPL HS-MCNC: <3 NG/L
WBC # BLD AUTO: 5.54 K/UL (ref 3.9–12.7)

## 2025-05-01 PROCEDURE — 93005 ELECTROCARDIOGRAM TRACING: CPT

## 2025-05-01 PROCEDURE — 99285 EMERGENCY DEPT VISIT HI MDM: CPT | Mod: 25

## 2025-05-01 PROCEDURE — 80053 COMPREHEN METABOLIC PANEL: CPT

## 2025-05-01 PROCEDURE — 82330 ASSAY OF CALCIUM: CPT | Mod: 59

## 2025-05-01 PROCEDURE — 80047 BASIC METABLC PNL IONIZED CA: CPT

## 2025-05-01 PROCEDURE — 83880 ASSAY OF NATRIURETIC PEPTIDE: CPT

## 2025-05-01 PROCEDURE — G0378 HOSPITAL OBSERVATION PER HR: HCPCS

## 2025-05-01 PROCEDURE — 96375 TX/PRO/DX INJ NEW DRUG ADDON: CPT

## 2025-05-01 PROCEDURE — 93010 ELECTROCARDIOGRAM REPORT: CPT | Mod: ,,, | Performed by: INTERNAL MEDICINE

## 2025-05-01 PROCEDURE — 25000003 PHARM REV CODE 250

## 2025-05-01 PROCEDURE — 63600175 PHARM REV CODE 636 W HCPCS

## 2025-05-01 PROCEDURE — 85025 COMPLETE CBC W/AUTO DIFF WBC: CPT

## 2025-05-01 PROCEDURE — 84484 ASSAY OF TROPONIN QUANT: CPT | Mod: 91

## 2025-05-01 PROCEDURE — 96374 THER/PROPH/DIAG INJ IV PUSH: CPT

## 2025-05-01 RX ORDER — MORPHINE SULFATE 4 MG/ML
4 INJECTION, SOLUTION INTRAMUSCULAR; INTRAVENOUS
Refills: 0 | Status: COMPLETED | OUTPATIENT
Start: 2025-05-01 | End: 2025-05-01

## 2025-05-01 RX ORDER — ONDANSETRON HYDROCHLORIDE 2 MG/ML
4 INJECTION, SOLUTION INTRAVENOUS
Status: COMPLETED | OUTPATIENT
Start: 2025-05-01 | End: 2025-05-01

## 2025-05-01 RX ORDER — ALBUTEROL SULFATE 90 UG/1
2 INHALANT RESPIRATORY (INHALATION) EVERY 6 HOURS PRN
Qty: 18 G | Refills: 0 | Status: SHIPPED | OUTPATIENT
Start: 2025-05-01

## 2025-05-01 RX ORDER — DICLOFENAC SODIUM 10 MG/G
2 GEL TOPICAL 4 TIMES DAILY
Qty: 100 G | Refills: 0 | Status: SHIPPED | OUTPATIENT
Start: 2025-05-01

## 2025-05-01 RX ORDER — ALUMINUM HYDROXIDE, MAGNESIUM HYDROXIDE, AND SIMETHICONE 1200; 120; 1200 MG/30ML; MG/30ML; MG/30ML
30 SUSPENSION ORAL ONCE
Status: COMPLETED | OUTPATIENT
Start: 2025-05-01 | End: 2025-05-01

## 2025-05-01 RX ORDER — AMITRIPTYLINE HYDROCHLORIDE 50 MG/1
50 TABLET, FILM COATED ORAL NIGHTLY
Status: DISCONTINUED | OUTPATIENT
Start: 2025-05-01 | End: 2025-05-01 | Stop reason: HOSPADM

## 2025-05-01 RX ORDER — METOPROLOL TARTRATE 50 MG/1
50 TABLET ORAL 2 TIMES DAILY
Status: DISCONTINUED | OUTPATIENT
Start: 2025-05-01 | End: 2025-05-01 | Stop reason: HOSPADM

## 2025-05-01 RX ORDER — FUROSEMIDE 20 MG/1
20 TABLET ORAL DAILY
Status: DISCONTINUED | OUTPATIENT
Start: 2025-05-02 | End: 2025-05-01 | Stop reason: HOSPADM

## 2025-05-01 RX ORDER — LIDOCAINE HYDROCHLORIDE 20 MG/ML
15 SOLUTION OROPHARYNGEAL ONCE
Status: COMPLETED | OUTPATIENT
Start: 2025-05-01 | End: 2025-05-01

## 2025-05-01 RX ORDER — NAPROXEN 500 MG/1
500 TABLET ORAL 3 TIMES DAILY
Qty: 20 TABLET | Refills: 0 | Status: SHIPPED | OUTPATIENT
Start: 2025-05-01

## 2025-05-01 RX ORDER — ACETAMINOPHEN 500 MG
1000 TABLET ORAL EVERY 6 HOURS PRN
Qty: 30 TABLET | Refills: 0 | Status: SHIPPED | OUTPATIENT
Start: 2025-05-01 | End: 2025-05-08

## 2025-05-01 RX ADMIN — ALUMINUM HYDROXIDE, MAGNESIUM HYDROXIDE, AND SIMETHICONE 30 ML: 200; 200; 20 SUSPENSION ORAL at 12:05

## 2025-05-01 RX ADMIN — ONDANSETRON 4 MG: 2 INJECTION INTRAMUSCULAR; INTRAVENOUS at 01:05

## 2025-05-01 RX ADMIN — LIDOCAINE HYDROCHLORIDE 15 ML: 20 SOLUTION ORAL at 12:05

## 2025-05-01 RX ADMIN — MORPHINE SULFATE 4 MG: 4 INJECTION INTRAVENOUS at 01:05

## 2025-05-01 NOTE — ED PROVIDER NOTES
"Encounter Date: 5/1/2025       History     Chief Complaint   Patient presents with    Chest Pain     Hx of A-fib EKG shows in A-fib      34 y/o female with a medical hx of Tetralogy of Fallot with repair, atrial fibrillation on Eliquis, VSD, HTN, ovarian cyst presenting with chest pain. Reports that this started 4 days ago and describes it as intermittent tightness/stabbing pain on the right side of her chest that sometimes radiates to the center of her chest. States that these episodes have been happening every 5-10 min and last for about 5-10 seconds. She endorses associated palpitations but not different from her baseline. She denies dizziness, lightheadedness, presyncopal and syncopal episodes. Reports SOB which she says is slightly worse than her baseline. Also reports abdominal pain around her umbilicus that started last night after dinner and has been giving her mild discomfort. She denies N/V but reports she has had diarrhea for the past few days. She also endorses having indigestion for the past few weeks.  She says that she normally follows with a cardiologist at [facility], she believes that they are not a pediatric cardiologist.    The history is provided by the patient. No  was used.     Review of patient's allergies indicates:   Allergen Reactions    Articaine-epinephrine bitart Hives and Swelling     Pt states face "swole up" with this local     Benzocaine Hives and Swelling     Pt states her face "swole up" with this local    Iodine and iodide containing products      Past Medical History:   Diagnosis Date    Hypertension     Murmur, cardiac     Tetralogy of Fallot      Past Surgical History:   Procedure Laterality Date    CARDIAC SURGERY      multiple - repair of Tetralogy of Fallot     Family History   Problem Relation Name Age of Onset    No Known Problems Father       Social History[1]  Review of Systems    Physical Exam     Initial Vitals [05/01/25 1110]   BP Pulse Resp Temp SpO2 "   129/69 78 18 99 °F (37.2 °C) 98 %      MAP       --         Physical Exam    Nursing note and vitals reviewed.  Constitutional: She appears well-developed and well-nourished. She is not diaphoretic. No distress.   HENT:   Head: Normocephalic.   Eyes: Pupils are equal, round, and reactive to light.   Neck: Neck supple.   Cardiovascular:  Normal rate, regular rhythm, normal heart sounds and intact distal pulses.           Musculoskeletal:      Cervical back: Neck supple.           ED Course   Procedures  Labs Reviewed   CBC WITH DIFFERENTIAL - Abnormal       Result Value    WBC 5.54      RBC 4.93      HGB 13.1      HCT 41.8      MCV 85      MCH 26.6 (*)     MCHC 31.3 (*)     RDW 13.5      Platelet Count 269      MPV 10.2      Nucleated RBC 0      Neut % 57.3      Lymph % 32.1      Mono % 7.0      Eos % 2.5      Basophil % 0.7      Imm Grans % 0.4      Neut # 3.17      Lymph # 1.78      Mono # 0.39      Eos # 0.14      Baso # 0.04      Imm Grans # 0.02     ISTAT PROCEDURE - Abnormal    POC Glucose 122 (*)     POC BUN 17      POC Creatinine 1.4      POC Sodium 138      POC Potassium 3.5      POC Chloride 100      POC TCO2 (MEASURED) 26      POC Ionized Calcium 1.17      POC Hematocrit 40      Sample RODERICK     COMPREHENSIVE METABOLIC PANEL - Normal    Sodium 137      Potassium 3.8      Chloride 103      CO2 23      Glucose 91      BUN 7      Creatinine 1.1      Calcium 9.6      Protein Total 8.3      Albumin 4.2      Bilirubin Total 0.7      ALP 55      AST 36      ALT 32      Anion Gap 11      eGFR >60     TROPONIN I HIGH SENSITIVITY - Normal    Troponin High Sensitive <3     TROPONIN I HIGH SENSITIVITY - Normal    Troponin High Sensitive <3     B-TYPE NATRIURETIC PEPTIDE - Normal    BNP <10     CBC W/ AUTO DIFFERENTIAL    Narrative:     The following orders were created for panel order CBC auto differential.  Procedure                               Abnormality         Status                     ---------                                -----------         ------                     CBC with Differential[[id number]]       Abnormal            Final result                 Please view results for these tests on the individual orders.        ECG Results              EKG 12-lead (Final result)        Collection Time Result Time QRS Duration [facility] QTC Calculation    05/01/25 14:37:18 05/01/25 16:06:09 144 482                     Final result by Interface, Lab In [facility] (05/01/25 16:06:12)                   Narrative:    Test Reason : R07.9,    Vent. Rate :  77 BPM     Atrial Rate :  77 BPM     P-R Int : 152 ms          QRS Dur : 144 ms      QT Int : 426 ms       P-R-T Axes :  28   4  81 degrees    QTcB Int : 482 ms    Normal sinus rhythm  Right bundle branch block  Abnormal ECG  When compared with ECG of 01-May-2025 11:05,  P waves more visible on current EKG  Confirmed by Brenton Sharma ([id number]) on 5/1/2025 4:06:04 PM    Referred By: AAAREFERRAL SELF           Confirmed By: Brenton Sharma                                     EKG 12-lead (Final result)        Collection Time Result Time QRS Duration [facility] QTC Calculation    05/01/25 11:05:24 05/01/25 11:20:25 142 468                     Final result by Interface, Lab In [facility] (05/01/25 11:20:29)                   Narrative:    Test Reason : R07.9,    Vent. Rate :  77 BPM     Atrial Rate :    BPM     P-R Int :    ms          QRS Dur : 142 ms      QT Int : 414 ms       P-R-T Axes :    -24 150 degrees    QTcB Int : 468 ms    Atrial fibrillation  Right bundle branch block  T wave abnormality, consider lateral ischemia  Abnormal ECG  When compared with ECG of 22-Nov-2021 18:08,  Atrial fibrillation has replaced Sinus rhythm  Questionable change in The axis  Confirmed by Antonio Alas ([id number]) on 5/1/2025 11:20:23 AM    Referred By:            Confirmed By: Antonio Maxwell                                  Imaging Results              X-Ray Chest AP Portable (Final result)  Result time 05/01/25  13:07:23      Final result by Edu Adame III, MD (05/01/25 13:07:23)                   Impression:      No acute process seen.      Electronically signed by: Edu Adame MD  Date:    05/01/2025  Time:    13:07               Narrative:    EXAMINATION:  XR CHEST AP PORTABLE    CLINICAL HISTORY:  Chest Pain;    FINDINGS:  Chest one view AP portable.    Heart size is normal.  There is postoperative change.  Lungs are clear.  The bones are noncontributory.                                       Medications   aluminum-magnesium hydroxide-simethicone 200-200-20 mg/5 mL suspension 30 mL (30 mLs Oral Given 5/1/25 1210)     And   LIDOcaine viscous HCl 2% oral solution 15 mL (15 mLs Oral Given 5/1/25 1210)   morphine injection 4 mg (4 mg Intravenous Given 5/1/25 1310)   ondansetron injection 4 mg (4 mg Intravenous Given 5/1/25 1310)     Medical Decision Making  Amount and/or Complexity of Data Reviewed  Labs: ordered.  Radiology: ordered.  ECG/medicine tests:  Decision-making details documented in ED Course.    Risk  OTC drugs.  Prescription drug management.               ED Course as of 05/03/25 0021   Thu May 01, 2025   1108 EKG 12-lead  No STEMI, history of tetralogy of Fallot, stable at baseline, sinus (low voltage P waves) occasional ectopy [AC]   1230 35-year-old female in no acute distress.  Patient is very well-appearing and nontoxic on exam.  Initial vital signs were all within normal limits.  Physical exam is unremarkable.  Differential includes but is not limited to ACS versus symptomatic dysrhythmia versus musculoskeletal pain versus pneumonia [BP]   1320 X-Ray Chest AP Portable  No obvious infiltrate, edema or pneumothorax on my independent review [BP]   1500 The patient's laboratory evaluation was entirely unremarkable.  We do not have any echo on file, given the patient's complex prior medical history we will admit for observation and further workup of chest pain of unclear etiology. [BP]      ED Course  User Index  [AC] Edison Doll DO  [BP] Norm Collins MD                           Clinical Impression:  Final diagnoses:  [R07.9] Chest pain          ED Disposition Condition    Observation                     [1]   Social History  Tobacco Use    Smoking status: Former     Current packs/day: 0.00     Average packs/day: 0.5 packs/day for 15.8 years (7.9 ttl pk-yrs)     Types: Cigarettes     Start date: 2003     Quit date: 10/28/2018     Years since quittin.5    Smokeless tobacco: Never   Substance Use Topics    Alcohol use: Yes    Drug use: No        Norm Collins MD  Resident  25 0021

## 2025-05-01 NOTE — HPI
"Marita Cooks is a 35 year old Female with a PMHx of tetralogy of fallot s/p repair, anxiety, and afib that presents with right sided chest pain. Patient reports pain started 4 days ago and feels like a "lightning bolt" which lasts 1-2 seconds and then goes away. Reports this has been intermittent but mostly happens at night when she is laying down. Denies any nausea, radiating pain, chest pressure, PICKERING, abdominal pain. Denies recent illness, orthopnea. Reports pain in reproducible when she touches the right side of her sternum.     Upon evaluation, patient afebrile, normotensive, HR normal, saturating 100% on room air. CBC and CMP unremarkable. Troponin <3 x1, BNP <10. EKG NSR w/ RBBB and similar to prior EKG in 2021. CXR clear. Patient admitted to  for further evaluation.  "

## 2025-05-01 NOTE — H&P
"[facility] - Emergency Dept  [facility] Medicine  History & Physical    Patient Name: Marita Deneen Cooks  MRN: [id number]  Patient Class: OP- Observation  Admission Date: 5/1/2025  Attending Physician: Katarina att. providers found   Primary Care Provider: Katarina, Primary Doctor         Patient information was obtained from patient and ER records.     Subjective:     Principal Problem:Right-sided chest pain    Chief Complaint:   Chief Complaint   Patient presents with    Chest Pain     Hx of A-fib EKG shows in A-fib         HPI: Marita Cooks is a 35 year old Female with a PMHx of tetralogy of fallot s/p repair, anxiety, and afib that presents with right sided chest pain. Patient reports pain started 4 days ago and feels like a "lightning bolt" which lasts 1-2 seconds and then goes away. Reports this has been intermittent but mostly happens at night when she is laying down. Denies any nausea, radiating pain, chest pressure, PICKERING, abdominal pain. Denies recent illness, orthopnea. Reports pain in reproducible when she touches the right side of her sternum.     Upon evaluation, patient afebrile, normotensive, HR normal, saturating 100% on room air. CBC and CMP unremarkable. Troponin <3 x1, BNP <10. EKG NSR w/ RBBB and similar to prior EKG in 2021. CXR clear. Patient admitted to  for further evaluation.    Past Medical History:   Diagnosis Date    Hypertension     Murmur, cardiac     Tetralogy of Fallot        Past Surgical History:   Procedure Laterality Date    CARDIAC SURGERY      multiple - repair of Tetralogy of Fallot       Review of patient's allergies indicates:   Allergen Reactions    Articaine-epinephrine bitart Hives and Swelling     Pt states face "swole up" with this local     Benzocaine Hives and Swelling     Pt states her face "swole up" with this local    Iodine and iodide containing products        No current facility-administered medications on file prior to encounter.     Current Outpatient Medications on File Prior " to Encounter   Medication Sig    acetaminophen (TYLENOL) 500 MG tablet Take 1 tablet (500 mg total) by mouth every 6 (six) hours as needed for Pain.    albuterol (PROVENTIL/VENTOLIN HFA) 90 mcg/actuation inhaler Inhale 2 puffs into the lungs every 6 (six) hours as needed for Wheezing or Shortness of Breath. Rescue (Patient not taking: Reported on 11/1/2024)    amitriptyline (ELAVIL) 50 MG tablet Take 50 mg by mouth every evening.    ELIQUIS 5 mg Tab Take 5 mg by mouth 2 (two) times daily.    erythromycin (ROMYCIN) ophthalmic ointment Place a 1/2 inch ribbon of ointment into the lower eyelid 3 to 4 times a day for 5 days. (Patient not taking: Reported on 11/1/2024)    furosemide (LASIX) 20 MG tablet Take 20 mg by mouth.    gabapentin (NEURONTIN) 300 MG capsule Take by mouth.    ibuprofen (ADVIL,MOTRIN) 800 MG tablet Take 1 tablet (800 mg total) by mouth every 6 (six) hours as needed. (Patient not taking: Reported on 11/1/2024)    loratadine (CLARITIN) 10 mg tablet Take 1 tablet (10 mg total) by mouth once daily.    metoprolol tartrate (LOPRESSOR) 50 MG tablet     naltrexone (DEPADE) 50 mg tablet Take 25 mg by mouth.    naproxen (NAPROSYN) 500 MG tablet Take 1 tablet (500 mg total) by mouth 2 (two) times daily.    naproxen (NAPROSYN) 500 MG tablet Take 1 tablet (500 mg total) by mouth 2 (two) times daily.    norethindrone-ethinyl estradiol (JUNEL FE 1/20) 1 mg-20 mcg (21)/75 mg (7) per tablet Take 1 tablet by mouth once daily. (Patient not taking: Reported on 11/1/2024)    ondansetron (ZOFRAN-ODT) 4 MG TbDL Take 1 tablet (4 mg total) by mouth every 6 (six) hours as needed (Nausea).    promethazine (PHENERGAN) 12.5 MG Tab Take 1 tablet (12.5 mg total) by mouth 4 (four) times daily. (Patient not taking: Reported on 11/1/2024)    traMADoL (ULTRAM) 50 mg tablet TAKE 1 TABLET(50 MG) BY MOUTH EVERY 6 HOURS AS NEEDED FOR PAIN     Family History       Problem Relation (Age of Onset)    No Known Problems Father           Tobacco Use    Smoking status: Former     Current packs/day: 0.00     Average packs/day: 0.5 packs/day for 15.8 years (7.9 ttl pk-yrs)     Types: Cigarettes     Start date: 2003     Quit date: 10/28/2018     Years since quittin.5    Smokeless tobacco: Never   Substance and Sexual Activity    Alcohol use: Yes    Drug use: No    Sexual activity: Yes     ROS  General ROS: negative for weight loss, weight gain, fevers, chills, night sweats  ENT ROS: negative for epistaxis, headaches or sinus pain  Ophthalmic ROS: negative for blurry vision, decreased vision, double vision or itchy eyes  Cardiovascular ROS: See HPI  Respiratory ROS: negative for cough, shortness of breath, or wheezing  Gastrointestinal ROS: negative for abdominal pain, change in bowel habits, black or bloody stools, nausea, emesis, or bloating  Genito-Urinary ROS: negative for dysuria, trouble voiding, or hematuria  Neurological ROS: negative for TIA or stroke symptoms  Endocrine ROS: negative for hair pattern changes or unexpected weight changes  Musculoskeletal ROS: negative for muscle pain, weakness, joint pain or joint swelling  Skin: negative for rash, wounds, skin breakdown, or issues otherwise  Hematological and Lymphatic ROS: negative for bleeding, bruising, swollen lymph nodes  Psychological ROS: negative for anxiety or depression      Objective:     Vital Signs (Most Recent):  Temp: 98.9 °F (37.2 °C) (25 1330)  Pulse: 77 (25 1330)  Resp: 17 (25 1330)  BP: 130/72 (25 1330)  SpO2: 98 % (25 1330) Vital Signs (24h Range):  Temp:  [98.9 °F (37.2 °C)-99 °F (37.2 °C)] 98.9 °F (37.2 °C)  Pulse:  [77-78] 77  Resp:  [17-18] 17  SpO2:  [98 %] 98 %  BP: (129-130)/(69-72) 130/72     Weight: 65.8 kg (145 lb)  Body mass index is 24.89 kg/m².     Physical Exam:  Gen: in NAD, appears stated age  Neuro: AAOx4, CN2-12 grossly intact BL; motor, sensory, and strength grossly intact BL  HEENT: NTNC, EOMI, MMM  CVS:  "Tenderness to light and deep palpation along right sternal border. RRR, no m/r/g; S1/S2 auscultated with no S3 or S4  Resp: lungs CTAB, no w/r/r; no belabored breathing or accessory muscle use appreciated   Abd: NTND  Extrem: no UE or LE edema BL             Significant Labs: All pertinent labs within the past 24 hours have been reviewed.    Significant Imaging: I have reviewed all pertinent imaging results/findings within the past 24 hours.  Assessment/Plan:     Assessment & Plan  Right-sided chest pain  Costochondritis  Patient with 4 day history of intermittent sharp "lightning bolt" pain that lasts 1-2 seconds. Worse at night when laying down. Cardiac workup thus far negative. Does have history of tetrology of fallot. CXR clear. Trop negative, EKG NSR with old RBBB similar to prior. BNP < 10. Euvolemic on exam.   - Tenderness to light and deep palpation along right sternal border.   - Follow up second trop.  - Discussed with patient, if second trop negative, will defer inpatient TTE and refer to cardiology with [facility] (patient wants to switch care to [facility] at this time).      Tetralogy of Fallot  - History noted  - Stable  Atrial fibrillation  - History noted  - Has been on Eliquis and metoprolol since 2016  - Compliant with above medications  - Continue Metoprolol/Eliquis  VTE Risk Mitigation (From admission, onward)      None               On 05/01/2025, patient should be placed in hospital observation services under my care.             Mani Pelletier MD  Department of Hospital Medicine  [facility] - Emergency Dept          "

## 2025-05-01 NOTE — ED TRIAGE NOTES
Pt arriving to ED c/o R sided chest pain x4 days, constant 7/10, +SOB. Denies N/V. Hx of A-fib EKG shows in A-fib

## 2025-05-01 NOTE — ASSESSMENT & PLAN NOTE
"Patient with 4 day history of intermittent sharp "lightning bolt" pain that lasts 1-2 seconds. Worse at night when laying down. Cardiac workup thus far negative. Does have history of tetrology of fallot. CXR clear. Trop negative, EKG NSR with old RBBB similar to prior. BNP < 10. Euvolemic on exam.   - Tenderness to light and deep palpation along right sternal border.   - Follow up second trop.  - Discussed with patient, if second trop negative, will defer inpatient TTE and refer to cardiology with [facility] (patient wants to switch care to [facility] at this time).      "

## 2025-05-01 NOTE — SUBJECTIVE & OBJECTIVE
"Past Medical History:   Diagnosis Date    Hypertension     Murmur, cardiac     Tetralogy of Fallot        Past Surgical History:   Procedure Laterality Date    CARDIAC SURGERY      multiple - repair of Tetralogy of Fallot       Review of patient's allergies indicates:   Allergen Reactions    Articaine-epinephrine bitart Hives and Swelling     Pt states face "swole up" with this local     Benzocaine Hives and Swelling     Pt states her face "swole up" with this local    Iodine and iodide containing products        No current facility-administered medications on file prior to encounter.     Current Outpatient Medications on File Prior to Encounter   Medication Sig    acetaminophen (TYLENOL) 500 MG tablet Take 1 tablet (500 mg total) by mouth every 6 (six) hours as needed for Pain.    albuterol (PROVENTIL/VENTOLIN HFA) 90 mcg/actuation inhaler Inhale 2 puffs into the lungs every 6 (six) hours as needed for Wheezing or Shortness of Breath. Rescue (Patient not taking: Reported on 11/1/2024)    amitriptyline (ELAVIL) 50 MG tablet Take 50 mg by mouth every evening.    ELIQUIS 5 mg Tab Take 5 mg by mouth 2 (two) times daily.    erythromycin (ROMYCIN) ophthalmic ointment Place a 1/2 inch ribbon of ointment into the lower eyelid 3 to 4 times a day for 5 days. (Patient not taking: Reported on 11/1/2024)    furosemide (LASIX) 20 MG tablet Take 20 mg by mouth.    gabapentin (NEURONTIN) 300 MG capsule Take by mouth.    ibuprofen (ADVIL,MOTRIN) 800 MG tablet Take 1 tablet (800 mg total) by mouth every 6 (six) hours as needed. (Patient not taking: Reported on 11/1/2024)    loratadine (CLARITIN) 10 mg tablet Take 1 tablet (10 mg total) by mouth once daily.    metoprolol tartrate (LOPRESSOR) 50 MG tablet     naltrexone (DEPADE) 50 mg tablet Take 25 mg by mouth.    naproxen (NAPROSYN) 500 MG tablet Take 1 tablet (500 mg total) by mouth 2 (two) times daily.    naproxen (NAPROSYN) 500 MG tablet Take 1 tablet (500 mg total) by mouth 2 " (two) times daily.    norethindrone-ethinyl estradiol (JUNEL FE ) 1 mg-20 mcg (21)/75 mg (7) per tablet Take 1 tablet by mouth once daily. (Patient not taking: Reported on 2024)    ondansetron (ZOFRAN-ODT) 4 MG TbDL Take 1 tablet (4 mg total) by mouth every 6 (six) hours as needed (Nausea).    promethazine (PHENERGAN) 12.5 MG Tab Take 1 tablet (12.5 mg total) by mouth 4 (four) times daily. (Patient not taking: Reported on 2024)    traMADoL (ULTRAM) 50 mg tablet TAKE 1 TABLET(50 MG) BY MOUTH EVERY 6 HOURS AS NEEDED FOR PAIN     Family History       Problem Relation (Age of Onset)    No Known Problems Father          Tobacco Use    Smoking status: Former     Current packs/day: 0.00     Average packs/day: 0.5 packs/day for 15.8 years (7.9 ttl pk-yrs)     Types: Cigarettes     Start date: 2003     Quit date: 10/28/2018     Years since quittin.5    Smokeless tobacco: Never   Substance and Sexual Activity    Alcohol use: Yes    Drug use: No    Sexual activity: Yes     ROS  General ROS: negative for weight loss, weight gain, fevers, chills, night sweats  ENT ROS: negative for epistaxis, headaches or sinus pain  Ophthalmic ROS: negative for blurry vision, decreased vision, double vision or itchy eyes  Cardiovascular ROS: See HPI  Respiratory ROS: negative for cough, shortness of breath, or wheezing  Gastrointestinal ROS: negative for abdominal pain, change in bowel habits, black or bloody stools, nausea, emesis, or bloating  Genito-Urinary ROS: negative for dysuria, trouble voiding, or hematuria  Neurological ROS: negative for TIA or stroke symptoms  Endocrine ROS: negative for hair pattern changes or unexpected weight changes  Musculoskeletal ROS: negative for muscle pain, weakness, joint pain or joint swelling  Skin: negative for rash, wounds, skin breakdown, or issues otherwise  Hematological and Lymphatic ROS: negative for bleeding, bruising, swollen lymph nodes  Psychological ROS: negative for  anxiety or depression      Objective:     Vital Signs (Most Recent):  Temp: 98.9 °F (37.2 °C) (05/01/25 1330)  Pulse: 77 (05/01/25 1330)  Resp: 17 (05/01/25 1330)  BP: 130/72 (05/01/25 1330)  SpO2: 98 % (05/01/25 1330) Vital Signs (24h Range):  Temp:  [98.9 °F (37.2 °C)-99 °F (37.2 °C)] 98.9 °F (37.2 °C)  Pulse:  [77-78] 77  Resp:  [17-18] 17  SpO2:  [98 %] 98 %  BP: (129-130)/(69-72) 130/72     Weight: 65.8 kg (145 lb)  Body mass index is 24.89 kg/m².     Physical Exam:  Gen: in NAD, appears stated age  Neuro: AAOx4, CN2-12 grossly intact BL; motor, sensory, and strength grossly intact BL  HEENT: NTNC, EOMI, MMM  CVS: Tenderness to light and deep palpation along right sternal border. RRR, no m/r/g; S1/S2 auscultated with no S3 or S4  Resp: lungs CTAB, no w/r/r; no belabored breathing or accessory muscle use appreciated   Abd: NTND  Extrem: no UE or LE edema BL             Significant Labs: All pertinent labs within the past 24 hours have been reviewed.    Significant Imaging: I have reviewed all pertinent imaging results/findings within the past 24 hours.

## 2025-05-01 NOTE — DISCHARGE SUMMARY
"[facility] - Emergency Dept  [facility] Medicine  Discharge Summary      Patient Name: Marita Deneen Cooks  MRN: [id number]  LILY: [id number]  Patient Class: OP- Observation  Admission Date: 5/1/2025  Hospital Length of Stay: 0 days  Discharge Date and Time: 05/01/2025 2:56 PM  Attending Physician: Katarina att. providers found   Discharging Provider: Mani Pelletier MD  Primary Care Provider: Katarina, Primary Doctor  [facility] Medicine Team: [facility] HOSP MED  Mani Pelletier MD  Primary Care Team: [facility] MED     HPI:   Marita Cooks is a 35 year old Female with a PMHx of tetralogy of fallot s/p repair, anxiety, and afib that presents with right sided chest pain. Patient reports pain started 4 days ago and feels like a "lightning bolt" which lasts 1-2 seconds and then goes away. Reports this has been intermittent but mostly happens at night when she is laying down. Denies any nausea, radiating pain, chest pressure, PICKERING, abdominal pain. Denies recent illness, orthopnea. Reports pain in reproducible when she touches the right side of her sternum.     Upon evaluation, patient afebrile, normotensive, HR normal, saturating 100% on room air. CBC and CMP unremarkable. Troponin <3 x1, BNP <10. EKG NSR w/ RBBB and similar to prior EKG in 2021. CXR clear. Patient admitted to  for further evaluation.    * No surgery found *      Hospital Course:   Patient admitted to observation unit for 4 day history of intermittent sharp "lightning bolt" pain that lasts 1-2 seconds. Cardiac workup negative (trop <3 x2, BNP < 10). EKG  EKG NSR with old RBBB similar to prior. CXR clear. Euvolemic on exam. Chest pain was reproducible on physical exam and right sternal border very tender to soft/deep palpation. Deferred inpatient TTE given reproducible pain and negative cardiac work up. Will refer to [facility] cardiology per patient's request, as well [facility]. Patient discharged home on APAP, ibuprofen, and lidocaine patch for costochondritis. Patient seen " "and evaluated on day of discharge. Pt deemed appropriate for discharge. Plan discussed with pt, who was agreeable and amenable; medications were discussed and reviewed, outpatient follow-up scheduled, ER precautions were given, all questions were answered to the pt's satisfaction, and patient was subsequently discharged.       Goals of Care Treatment Preferences:            Consults:     Assessment & Plan  Right-sided chest pain  Costochondritis  Patient with 4 day history of intermittent sharp "lightning bolt" pain that lasts 1-2 seconds. Worse at night when laying down. Cardiac workup thus far negative. Does have history of tetrology of fallot. CXR clear. Trop negative, EKG NSR with old RBBB similar to prior. BNP < 10. Euvolemic on exam.   - Tenderness to light and deep palpation along right sternal border.   - Follow up second trop.  - Discussed with patient, if second trop negative, will defer inpatient TTE and refer to cardiology with [facility] (patient wants to switch care to [facility] at this time).      Tetralogy of Fallot  - History noted  - Stable  Atrial fibrillation  - History noted  - Has been on Eliquis and metoprolol since 2016  - Compliant with above medications  - Continue Metoprolol/Eliquis    Final Active Diagnoses:    Diagnosis Date Noted POA    PRINCIPAL PROBLEM:  Right-sided chest pain [R07.9] 05/01/2025 Yes    Atrial fibrillation [I48.91] 05/01/2025 Yes    Costochondritis [M94.0] 05/01/2025 Yes    Tetralogy of Fallot [Q21.3] 11/02/2024 Not Applicable      Problems Resolved During this Admission:       Discharged Condition: good    Disposition: Home or Self Care    Follow Up:    Patient Instructions:      Ambulatory referral/consult to Cardiology   Standing Status: Future   Referral Priority: Routine Referral Type: Consultation   Referral Reason: Specialty Services Required   Requested Specialty: Cardiology   Number of Visits Requested: 1     Ambulatory referral/consult to Internal Medicine "   Standing Status: Future   Referral Priority: Routine Referral Type: Consultation   Referral Reason: Specialty Services Required   Requested Specialty: Internal Medicine   Number of Visits Requested: 1       Significant Diagnostic Studies: N/A    Pending Diagnostic Studies:       None           Medications:  Reconciled Home Medications:      Medication List        START taking these medications      diclofenac sodium 1 % Gel  Commonly known as: VOLTAREN ARTHRITIS PAIN  Apply 2 g topically 4 (four) times daily.            CHANGE how you take these medications      acetaminophen 500 MG tablet  Commonly known as: TYLENOL  Take 2 tablets (1,000 mg total) by mouth every 6 (six) hours as needed for Pain.  What changed: how much to take     naproxen 500 MG tablet  Commonly known as: NAPROSYN  Take 1 tablet (500 mg total) by mouth 3 (three) times daily.  What changed:   when to take this  Another medication with the same name was removed. Continue taking this medication, and follow the directions you see here.            CONTINUE taking these medications      albuterol 90 mcg/actuation inhaler  Commonly known as: PROVENTIL/VENTOLIN HFA  Inhale 2 puffs into the lungs every 6 (six) hours as needed for Wheezing or Shortness of Breath. Rescue     amitriptyline 50 MG tablet  Commonly known as: ELAVIL  Take 50 mg by mouth every evening.     ELIQUIS 5 mg Tab  Generic drug: apixaban  Take 5 mg by mouth 2 (two) times daily.     furosemide 20 MG tablet  Commonly known as: LASIX  Take 20 mg by mouth.     gabapentin 300 MG capsule  Commonly known as: NEURONTIN  Take by mouth.     loratadine 10 mg tablet  Commonly known as: CLARITIN  Take 1 tablet (10 mg total) by mouth once daily.     metoprolol tartrate 50 MG tablet  Commonly known as: LOPRESSOR     naltrexone 50 mg tablet  Commonly known as: DEPADE  Take 25 mg by mouth.     ondansetron 4 MG Tbdl  Commonly known as: ZOFRAN-ODT  Take 1 tablet (4 mg total) by mouth every 6 (six) hours  as needed (Nausea).            STOP taking these medications      erythromycin ophthalmic ointment  Commonly known as: ROMYCIN     ibuprofen 800 MG tablet  Commonly known as: ADVIL,MOTRIN     norethindrone-ethinyl estradiol 1 mg-20 mcg (21)/75 mg (7) per tablet  Commonly known as: JUNEL FE 1/20     promethazine 12.5 MG Tab  Commonly known as: PHENERGAN     traMADoL 50 mg tablet  Commonly known as: ULTRAM              Indwelling Lines/Drains at time of discharge:   Lines/Drains/Airways       None                   Time spent on the discharge of patient: 35 minutes         Mani Pelletier MD  Department of Hospital Medicine  [facility] - Emergency Dept

## 2025-05-01 NOTE — ASSESSMENT & PLAN NOTE
- History noted  - Has been on Eliquis and metoprolol since 2016  - Compliant with above medications  - Continue Metoprolol/Eliquis

## 2025-05-01 NOTE — ED NOTES
Assumed care of the patient. Report received from Tk. Pt on continuous cardiac monitoring Pt in hospital gown, side rails up X2, bed low and locked, and call light is placed within reach. No family/visitors at bedside at this time. Pt denies any complaints or needs. EKG obtained.  Patient denies needs at this time.  Patient updated on POC.     Otilia Lydia Rameshs, a 35 y.o. female presents to the ED w/ complaint of CP.

## 2025-05-01 NOTE — HOSPITAL COURSE
"Patient admitted to observation unit for 4 day history of intermittent sharp "lightning bolt" pain that lasts 1-2 seconds. Cardiac workup negative (trop <3 x2, BNP < 10). EKG  EKG NSR with old RBBB similar to prior. CXR clear. Euvolemic on exam. Chest pain was reproducible on physical exam and right sternal border very tender to soft/deep palpation. Deferred inpatient TTE given reproducible pain and negative cardiac work up. Will refer to [facility] cardiology per patient's request, as well [facility]. Patient discharged home on APAP, ibuprofen, and lidocaine patch for costochondritis. Patient seen and evaluated on day of discharge. Pt deemed appropriate for discharge. Plan discussed with pt, who was agreeable and amenable; medications were discussed and reviewed, outpatient follow-up scheduled, ER precautions were given, all questions were answered to the pt's satisfaction, and patient was subsequently discharged.    "

## 2025-05-02 LAB
BUN SERPL-MCNC: 17 MG/DL (ref 6–30)
CHLORIDE SERPL-SCNC: 100 MMOL/L (ref 95–110)
CREAT SERPL-MCNC: 1.4 MG/DL (ref 0.5–1.4)
GLUCOSE SERPL-MCNC: 122 MG/DL (ref 70–110)
HCT VFR BLD CALC: 40 %PCV (ref 36–54)
POC IONIZED CALCIUM: 1.17 MMOL/L (ref 1.06–1.42)
POC TCO2 (MEASURED): 26 MMOL/L (ref 23–29)
POTASSIUM BLD-SCNC: 3.5 MMOL/L (ref 3.5–5.1)
SAMPLE: ABNORMAL
SODIUM BLD-SCNC: 138 MMOL/L (ref 136–145)

## 2025-05-05 ENCOUNTER — PATIENT MESSAGE (OUTPATIENT)
Dept: CARDIOLOGY | Facility: CLINIC | Age: 35
End: 2025-05-05

## 2025-05-09 ENCOUNTER — PATIENT MESSAGE (OUTPATIENT)
Dept: CARDIOLOGY | Facility: CLINIC | Age: 35
End: 2025-05-09
Payer: MEDICARE

## 2025-05-09 DIAGNOSIS — Q21.3 TETRALOGY OF FALLOT: Primary | ICD-10-CM

## 2025-05-28 ENCOUNTER — HOSPITAL ENCOUNTER (OUTPATIENT)
Dept: CARDIOLOGY | Facility: HOSPITAL | Age: 35
Discharge: HOME | End: 2025-05-28
Attending: PEDIATRICS
Payer: MEDICARE

## 2025-05-28 VITALS — BODY MASS INDEX: 24.75 KG/M2 | WEIGHT: 145 LBS | HEIGHT: 64 IN

## 2025-05-28 DIAGNOSIS — Q21.3 TETRALOGY OF FALLOT: ICD-10-CM

## 2025-05-28 LAB
AORTIC SIZE INDEX (SOV): 2 CM/M2
AORTIC SIZE INDEX: 1.8 CM/M2
ASCENDING AORTA: 3 CM
AV AREA BY CONTINUOUS VTI: 3.1 CM2
AV INDEX (PROSTH): 0.74
AV LVOT MEAN GRADIENT: 2 MMHG
AV LVOT PEAK GRADIENT: 4 MMHG
AV MEAN GRADIENT: 4 MMHG
AV PEAK GRADIENT: 8 MMHG
AV VALVE AREA BY VELOCITY RATIO: 3 CM²
AV VALVE AREA: 3.1 CM2
AV VELOCITY RATIO: 0.71
BSA FOR ECHO PROCEDURE: 1.72 M2
CV ECHO LV RWT: 0.31 CM
DOP CALC AO PEAK VEL: 1.4 M/S
DOP CALC AO VTI: 24.5 CM
DOP CALC LVOT AREA: 4.2 CM2
DOP CALC LVOT DIAMETER: 2.3 CM
DOP CALC LVOT PEAK VEL: 1 M/S
DOP CALC LVOT STROKE VOLUME: 75.2 CM3
DOP CALC RVOT VTI: 25.23 CM
DOP CALCLVOT PEAK VEL VTI: 18.1 CM
E WAVE DECELERATION TIME: 151 MS
E/A RATIO: 1.67
E/E' RATIO: 7 M/S
ECHO EF ESTIMATED: 71 %
ECHO LV POSTERIOR WALL: 0.7 CM (ref 0.6–1.1)
FRACTIONAL SHORTENING: 40 % (ref 28–44)
GLOBAL LONGITUIDAL STRAIN: 18.6 %
INTERVENTRICULAR SEPTUM: 0.7 CM (ref 0.6–1.1)
LA MAJOR: 4.5 CM
LA MINOR: 4.8 CM
LA WIDTH: 3.2 CM
LEFT ATRIUM SIZE: 3.1 CM
LEFT ATRIUM VOLUME INDEX MOD: 26 ML/M2
LEFT ATRIUM VOLUME INDEX: 23 ML/M2
LEFT ATRIUM VOLUME MOD: 45 ML
LEFT ATRIUM VOLUME: 39 CM3
LEFT INTERNAL DIMENSION IN SYSTOLE: 2.7 CM (ref 2.1–4)
LEFT VENTRICLE DIASTOLIC VOLUME INDEX: 53.8 ML/M2
LEFT VENTRICLE DIASTOLIC VOLUME: 92 ML
LEFT VENTRICLE MASS INDEX: 55.9 G/M2
LEFT VENTRICLE SYSTOLIC VOLUME INDEX: 15.8 ML/M2
LEFT VENTRICLE SYSTOLIC VOLUME: 27 ML
LEFT VENTRICULAR INTERNAL DIMENSION IN DIASTOLE: 4.5 CM (ref 3.5–6)
LEFT VENTRICULAR MASS: 95.7 G
LV LATERAL E/E' RATIO: 5.4
LV SEPTAL E/E' RATIO: 8.1
MV PEAK A VEL: 0.39 M/S
MV PEAK E VEL: 0.65 M/S
OHS CV RV/LV RATIO: 0.78 CM
PISA TR MAX VEL: 2.5 M/S
PV MEAN GRADIENT: 6 MMHG
PV MV: 1.11 M/S
PV PEAK GRADIENT: 11 MMHG
PV PEAK VELOCITY: 1.63 M/S
RA MAJOR: 4.02 CM
RA WIDTH: 3 CM
RIGHT ATRIAL AREA: 10.4 CM2
RIGHT VENTRICLE DIASTOLIC BASEL DIMENSION: 3.5 CM
RV TISSUE DOPPLER FREE WALL SYSTOLIC VELOCITY 1 (APICAL 4 CHAMBER VIEW): 9.19 CM/S
SINUS: 3.42 CM
STJ: 3 CM
TDI LATERAL: 0.12 M/S
TDI SEPTAL: 0.08 M/S
TDI: 0.1 M/S
TRICUSPID ANNULAR PLANE SYSTOLIC EXCURSION: 1.2 CM
TV PEAK GRADIENT: 24 MMHG
Z-SCORE OF LEFT VENTRICULAR DIMENSION IN END DIASTOLE: -0.54
Z-SCORE OF LEFT VENTRICULAR DIMENSION IN END SYSTOLE: -0.67

## 2025-05-28 PROCEDURE — 93356 MYOCRD STRAIN IMG SPCKL TRCK: CPT

## 2025-05-28 PROCEDURE — 93325 DOPPLER ECHO COLOR FLOW MAPG: CPT | Mod: 26,,, | Performed by: PEDIATRICS

## 2025-05-28 PROCEDURE — 93356 MYOCRD STRAIN IMG SPCKL TRCK: CPT | Mod: ,,, | Performed by: PEDIATRICS

## 2025-05-28 PROCEDURE — 93320 DOPPLER ECHO COMPLETE: CPT | Mod: 26,,, | Performed by: PEDIATRICS

## 2025-05-28 PROCEDURE — 93303 ECHO TRANSTHORACIC: CPT | Mod: 26,,, | Performed by: PEDIATRICS

## 2025-05-30 ENCOUNTER — PATIENT MESSAGE (OUTPATIENT)
Dept: CARDIOLOGY | Facility: CLINIC | Age: 35
End: 2025-05-30
Payer: MEDICARE